# Patient Record
Sex: FEMALE | Race: WHITE | Employment: PART TIME | URBAN - NONMETROPOLITAN AREA
[De-identification: names, ages, dates, MRNs, and addresses within clinical notes are randomized per-mention and may not be internally consistent; named-entity substitution may affect disease eponyms.]

---

## 2020-06-18 ENCOUNTER — HOSPITAL ENCOUNTER (EMERGENCY)
Age: 20
Discharge: HOME | End: 2020-06-18
Payer: COMMERCIAL

## 2020-06-18 VITALS
SYSTOLIC BLOOD PRESSURE: 111 MMHG | BODY MASS INDEX: 27.74 KG/M2 | OXYGEN SATURATION: 99 % | HEIGHT: 66 IN | DIASTOLIC BLOOD PRESSURE: 64 MMHG | RESPIRATION RATE: 16 BRPM | WEIGHT: 172.6 LBS | HEART RATE: 75 BPM | TEMPERATURE: 97.2 F

## 2020-06-18 PROCEDURE — 99212 OFFICE O/P EST SF 10 MIN: CPT

## 2020-06-18 PROCEDURE — 99202 OFFICE O/P NEW SF 15 MIN: CPT | Performed by: NURSE PRACTITIONER

## 2020-06-18 RX ORDER — IBUPROFEN 800 MG/1
800 TABLET ORAL EVERY 6 HOURS PRN
COMMUNITY
End: 2020-07-31 | Stop reason: ALTCHOICE

## 2020-06-18 RX ORDER — AMOXICILLIN AND CLAVULANATE POTASSIUM 875; 125 MG/1; MG/1
1 TABLET, FILM COATED ORAL 2 TIMES DAILY WITH MEALS
Qty: 20 TABLET | Refills: 0 | Status: SHIPPED | OUTPATIENT
Start: 2020-06-18 | End: 2020-06-28

## 2020-06-18 ASSESSMENT — ENCOUNTER SYMPTOMS
CHEST TIGHTNESS: 0
DIARRHEA: 0
RHINORRHEA: 1
SINUS PRESSURE: 1
COUGH: 0
EYE REDNESS: 0
EYE ITCHING: 0
VOMITING: 0
ABDOMINAL PAIN: 0
SORE THROAT: 1
SHORTNESS OF BREATH: 0
NAUSEA: 0

## 2020-06-18 ASSESSMENT — PAIN DESCRIPTION - DESCRIPTORS: DESCRIPTORS: ACHING;SORE

## 2020-06-18 ASSESSMENT — PAIN DESCRIPTION - PAIN TYPE: TYPE: ACUTE PAIN

## 2020-06-18 ASSESSMENT — PAIN DESCRIPTION - FREQUENCY: FREQUENCY: CONTINUOUS

## 2020-06-18 ASSESSMENT — PAIN SCALES - GENERAL: PAINLEVEL_OUTOF10: 7

## 2020-06-18 ASSESSMENT — PAIN DESCRIPTION - LOCATION: LOCATION: EAR;THROAT

## 2020-06-18 NOTE — ED NOTES
Pt verbalized discharge instructions. Pt informed to go to ER if develop chest pain, shortness of breath or abdominal pain. Pt ambulatory out in stable condition. Assessment unchanged.        Raj Vivas RN  06/18/20 0214

## 2020-06-18 NOTE — ED TRIAGE NOTES
Pt ambulatory into esuc with c/o sinus drainage, sore throat with bilateral ear pain for the past four days. Pt states pain 7.

## 2020-07-31 ENCOUNTER — OFFICE VISIT (OUTPATIENT)
Dept: FAMILY MEDICINE CLINIC | Age: 20
End: 2020-07-31
Payer: COMMERCIAL

## 2020-07-31 VITALS
SYSTOLIC BLOOD PRESSURE: 110 MMHG | BODY MASS INDEX: 26.68 KG/M2 | OXYGEN SATURATION: 98 % | DIASTOLIC BLOOD PRESSURE: 80 MMHG | WEIGHT: 166 LBS | HEIGHT: 66 IN | HEART RATE: 78 BPM | TEMPERATURE: 97.1 F

## 2020-07-31 PROBLEM — J45.990 EXERCISE-INDUCED ASTHMA: Status: ACTIVE | Noted: 2020-07-31

## 2020-07-31 PROBLEM — R10.10 INTERMITTENT UPPER ABDOMINAL PAIN: Status: ACTIVE | Noted: 2020-07-31

## 2020-07-31 PROCEDURE — 99204 OFFICE O/P NEW MOD 45 MIN: CPT | Performed by: FAMILY MEDICINE

## 2020-07-31 ASSESSMENT — ENCOUNTER SYMPTOMS
SHORTNESS OF BREATH: 0
VOMITING: 0
COUGH: 0
CONSTIPATION: 1
DIARRHEA: 1
NAUSEA: 0

## 2020-07-31 ASSESSMENT — PATIENT HEALTH QUESTIONNAIRE - PHQ9
1. LITTLE INTEREST OR PLEASURE IN DOING THINGS: 0
SUM OF ALL RESPONSES TO PHQ9 QUESTIONS 1 & 2: 0
2. FEELING DOWN, DEPRESSED OR HOPELESS: 0
SUM OF ALL RESPONSES TO PHQ QUESTIONS 1-9: 0
SUM OF ALL RESPONSES TO PHQ QUESTIONS 1-9: 0

## 2020-07-31 NOTE — PROGRESS NOTES
pepper, zuchinni  Lunch -- left over chicken legs, rice and chicken broth and green beans  Breakfast -- bagel blueberry , pumpkin seed butter    Current Outpatient Medications:     Norgestimate-Eth Estradiol (SPRINTEC 28 PO), Take by mouth, Disp: , Rfl:     No Known Allergies    Subjective:      Review of Systems   Constitutional: Negative for fatigue and fever. Sometimes hot flashes   Respiratory: Negative for cough and shortness of breath. Cardiovascular: Negative for chest pain and leg swelling. Gastrointestinal: Positive for constipation (last episode from Friday night) and diarrhea (off and on 3 weeks ago, not sure of trigger). Negative for nausea and vomiting. No heartburn issues -- did have when had GB acting up   Skin: Negative for rash. Psychiatric/Behavioral: Negative for decreased concentration. Objective:     /80 (Site: Left Upper Arm, Position: Sitting, Cuff Size: Large Adult)   Pulse 78   Temp 97.1 °F (36.2 °C)   Ht 5' 6\" (1.676 m)   Wt 166 lb (75.3 kg)   SpO2 98%   BMI 26.79 kg/m²     Physical Exam  Constitutional:       General: She is not in acute distress. Appearance: Normal appearance. She is not ill-appearing. Cardiovascular:      Rate and Rhythm: Normal rate and regular rhythm. Heart sounds: No murmur. Pulmonary:      Effort: Pulmonary effort is normal. No respiratory distress. Breath sounds: Normal breath sounds. No wheezing. Abdominal:      General: Abdomen is flat. Bowel sounds are normal. There is no distension. Palpations: Abdomen is soft. There is no mass. Tenderness: There is no abdominal tenderness. There is no rebound. Musculoskeletal:         General: No swelling. Neurological:      Mental Status: She is alert. Psychiatric:         Mood and Affect: Mood normal.         Behavior: Behavior normal.         Thought Content:  Thought content normal.         Judgment: Judgment normal.         Assessment/Plan:

## 2020-07-31 NOTE — PATIENT INSTRUCTIONS
find links to an cari for your phone or other device. You'll find low-FODMAP cookbooks there too. After 6 to 8 weeks, you will start to try high-FODMAP foods again. You will add those foods back to your diet, one group at a time. Your doctor or dietitian will probably have you wait a few days before you add each new group of those foods. Keep a food diary. You can write down the foods you try and note how they make you feel. After a few weeks, you may have a better idea of what foods you should avoid and what foods make you feel your best.  What are the risks? There is some risk of not getting all of the vitamins and nutrients you need on the low-FODMAP diet. These include:  · Folate. · Thiamin. · Vitamin B6.  · Calcium. · Vitamin D. Your dietitian or doctor can help you find other sources of these if needed. This diet may limit your fiber intake. Try to plan your meals to include other sources of fiber. What foods are on the low-FODMAP diet? Here is a guide to foods that you can eat, plus the foods that you should avoid, when you are on the low-FODMAP diet. Grains  Okay to eat: Foods made from grains like arrowroot, buckwheat, corn, millet, and oats. You can also eat potato, quinoa, rice, sorghum, tapioca, and teff. Cereals, pasta, breads, corn tortillas and baked goods made from these grains are also okay. (These grains may be labeled \"gluten-free. \")  Avoid: Grains like wheat, barley, and rye. Avoid ingredients such as bulgur, couscous, durum, and semolina. And avoid cereals, breads, and pastas made from these grains. Avoid chickpea, lentil, and pea flour. Proteins  Okay to eat: Most meat, fish, and eggs without high-FODMAP sauces. You can have small amounts of almonds or hazelnuts (10 nuts). Macadamia nuts, peanuts, pecans, pine nuts, and walnuts are also okay. You can also eat lucas and pumpkin seeds, tofu, and tempeh. Avoid: Beans, chickpeas, lentils, and soybeans. Avoid pistachio and cashew nuts. And some sausages may have high-FODMAP ingredients. Dairy  Okay to eat: Lactose-free dairy milks. Rice milk and almond milk are okay. So are lactose-free yogurts, kefirs, ice creams, and sorbet from low-FODMAP fruits and sweeteners. (These are often labeled \"lactose-free. \") You can have small amounts (2 Tbsp) of cottage, cream, or ricotta cheese. Hard cheeses like cheddar, [place], ROSS, and Swiss are okay. So are small amounts (1 oz) of aged or ripened cheeses like Brie, blue, and feta. Avoid: Milk, including cow, goat, and sheep. Avoid condensed or evaporated milk, buttermilk, custard, cream, sour cream, yogurt, and ice cream. Avoid soy milk. (Check sauces for dairy ingredients.)  Vegetables  Okay to eat: Bamboo shoots, bell peppers, bok david, up to ½ cup of broccoli or cabbage (red or white), and cucumbers. Eggplant, green beans, lettuce, olives, parsnips, and potatoes are okay to eat. So are pumpkin, rutabaga, seaweed, sprouts, Swiss chard, and spinach. You can eat scallions (green part only) and squash (not butternut). You can eat tomatoes, turnips, watercress, yams, and zucchini. You can also have small amounts of artichoke hearts (from can, 1 oz), carrots, corn (½ cob), and sweet potato (½ cup). Avoid: Artichokes, asparagus, [place] sprouts, margarita cabbage, cauliflower, and celery. And avoid garlic, leeks, mushrooms, okra, onions, scallions (white part), shallots, and peas. Fruits  Okay to eat: Bananas, blueberries, cantaloupe, coconut, grapes, and honeydew. Kiwi, namrata, limes, oranges, passion fruit, papaya, and pineapple are also okay. You can eat plantain, raspberries, rhubarb, star fruit, strawberries, tangelo, and tangerine. You can also have small amounts of dried banana chips (up to 10 chips), dried cranberries (1 Tbsp), and shredded coconut (up to ¼ cup). Avoid: Apples, applesauce, apricots, avocados, blackberries, boysenberries, and cherries.  Also avoid dates, figs, grapefruit, guava, lychee, and mangoes. Don't eat nectarines, peaches, pears, persimmon, plums, prunes, tamarillo, or watermelon. And limit most canned and dried fruits. Oils, spices, condiments, and sweeteners  Okay to eat: Vegetable oils (including garlic infused), butter, ghee, lard, and margarine (no trans fat). You can have most fresh herbs like basil, chives, coriander, ana cristina, parsley, rosemary and thyme. You can have salt, jams made from low-FODMAP fruits, mayonnaise, and mustard. Soy sauce, hot sauce (no garlic), tamari, and vinegar are also okay. Sweeteners that are okay include sugar (sucrose), powdered (confectioner's) sugar, brown sugar, glucose, and maple syrup. You can also have some artificial sweeteners like aspartame, saccharine, and stevia. Avoid: Chutneys, hummus, jellies, garlic sauces, and gravies made with onion or garlic. Avoid pickles, relish, some salad dressings and soup stocks, salsa, and tomato paste. And avoid sauces and other foods with high fructose corn syrup, honey, molasses, and agave. Avoid artificial sweeteners (isomalt, mannitol, malitol, sorbitol, and xylitol). Avoid corn syrup solids, fructose, fruit juice concentrate, and polydextrose. Other foods and drinks  Okay to have: Water, soda water, tonic, soft drinks sweetened with sugar, ½ cup of low-FODMAP fruit juice, and most teas and alcohols. You can also eat foods made with baking powder and soda, cocoa, and gelatin. Avoid: Juices from high-FODMAP fruits and vegetables. And avoid fortified akash, chamomile and fennel teas, chicory-based drinks and coffee substitutes, and bouillon cubes. Follow-up care is a key part of your treatment and safety. Be sure to make and go to all appointments, and call your doctor if you are having problems. It's also a good idea to know your test results and keep a list of the medicines you take. Where can you learn more? Go to [email or web address]://[email or web address] org and sign in to your [email or web address] account.  Enter L235 in

## 2020-08-01 PROBLEM — E73.9 LACTOSE INTOLERANCE: Status: ACTIVE | Noted: 2020-08-01

## 2020-08-01 PROBLEM — R14.0 ABDOMINAL BLOATING: Status: ACTIVE | Noted: 2020-08-01

## 2020-08-04 ENCOUNTER — HOSPITAL ENCOUNTER (OUTPATIENT)
Age: 20
Discharge: HOME | End: 2020-08-04
Payer: COMMERCIAL

## 2020-08-04 DIAGNOSIS — R10.10 INTERMITTENT UPPER ABDOMINAL PAIN: ICD-10-CM

## 2020-08-04 DIAGNOSIS — R14.0 ABDOMINAL BLOATING: ICD-10-CM

## 2020-08-04 LAB
ALBUMIN SERPL-MCNC: 4.2 G/DL (ref 3.5–5.1)
ALP BLD-CCNC: 52 U/L (ref 38–126)
ALT SERPL-CCNC: 25 U/L (ref 11–66)
ANION GAP SERPL CALCULATED.3IONS-SCNC: 7 MEQ/L (ref 8–16)
AST SERPL-CCNC: 27 U/L (ref 5–40)
BASOPHILS # BLD: 0.3 %
BASOPHILS ABSOLUTE: 0 THOU/MM3 (ref 0–0.1)
BILIRUB SERPL-MCNC: 0.6 MG/DL (ref 0.3–1.2)
BUN BLDV-MCNC: 13 MG/DL (ref 7–22)
CALCIUM SERPL-MCNC: 8.9 MG/DL (ref 8.5–10.5)
CHLORIDE BLD-SCNC: 102 MEQ/L (ref 98–111)
CO2: 26 MEQ/L (ref 23–33)
CREAT SERPL-MCNC: 0.7 MG/DL (ref 0.4–1.2)
EOSINOPHIL # BLD: 1.9 %
EOSINOPHILS ABSOLUTE: 0.1 THOU/MM3 (ref 0–0.4)
ERYTHROCYTE [DISTWIDTH] IN BLOOD BY AUTOMATED COUNT: 11.7 % (ref 11.5–14.5)
ERYTHROCYTE [DISTWIDTH] IN BLOOD BY AUTOMATED COUNT: 40.9 FL (ref 35–45)
GFR SERPL CREATININE-BSD FRML MDRD: > 90 ML/MIN/1.73M2
GLUCOSE BLD-MCNC: 90 MG/DL (ref 70–108)
HCT VFR BLD CALC: 39.4 % (ref 37–47)
HEMOGLOBIN: 13.1 GM/DL (ref 12–16)
IMMATURE GRANS (ABS): 0.01 THOU/MM3 (ref 0–0.07)
IMMATURE GRANULOCYTES: 0.2 %
LYMPHOCYTES # BLD: 36.9 %
LYMPHOCYTES ABSOLUTE: 2.2 THOU/MM3 (ref 1–4.8)
MAGNESIUM: 2 MG/DL (ref 1.6–2.4)
MCH RBC QN AUTO: 32 PG (ref 26–33)
MCHC RBC AUTO-ENTMCNC: 33.2 GM/DL (ref 32.2–35.5)
MCV RBC AUTO: 96.3 FL (ref 81–99)
MONOCYTES # BLD: 6.6 %
MONOCYTES ABSOLUTE: 0.4 THOU/MM3 (ref 0.4–1.3)
NUCLEATED RED BLOOD CELLS: 0 /100 WBC
PLATELET # BLD: 195 THOU/MM3 (ref 130–400)
PMV BLD AUTO: 11.6 FL (ref 9.4–12.4)
POTASSIUM SERPL-SCNC: 4.1 MEQ/L (ref 3.5–5.2)
RBC # BLD: 4.09 MILL/MM3 (ref 4.2–5.4)
SEG NEUTROPHILS: 54.1 %
SEGMENTED NEUTROPHILS ABSOLUTE COUNT: 3.2 THOU/MM3 (ref 1.8–7.7)
SODIUM BLD-SCNC: 135 MEQ/L (ref 135–145)
TOTAL PROTEIN: 6.3 G/DL (ref 6.1–8)
TSH SERPL DL<=0.05 MIU/L-ACNC: 1.58 UIU/ML (ref 0.4–4.2)
WBC # BLD: 5.9 THOU/MM3 (ref 4.8–10.8)

## 2020-08-04 PROCEDURE — 85025 COMPLETE CBC W/AUTO DIFF WBC: CPT

## 2020-08-04 PROCEDURE — 36415 COLL VENOUS BLD VENIPUNCTURE: CPT

## 2020-08-04 PROCEDURE — 84443 ASSAY THYROID STIM HORMONE: CPT

## 2020-08-04 PROCEDURE — 83735 ASSAY OF MAGNESIUM: CPT

## 2020-08-04 PROCEDURE — 80053 COMPREHEN METABOLIC PANEL: CPT

## 2020-08-20 ENCOUNTER — PATIENT MESSAGE (OUTPATIENT)
Dept: FAMILY MEDICINE CLINIC | Age: 20
End: 2020-08-20
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
## 2020-08-24 ENCOUNTER — HOSPITAL ENCOUNTER (OUTPATIENT)
Dept: GENERAL RADIOLOGY | Age: 20
Discharge: HOME | End: 2020-08-24
Payer: COMMERCIAL

## 2020-08-24 ENCOUNTER — HOSPITAL ENCOUNTER (OUTPATIENT)
Age: 20
Discharge: HOME | End: 2020-08-24
Payer: COMMERCIAL

## 2020-08-24 PROCEDURE — 72040 X-RAY EXAM NECK SPINE 2-3 VW: CPT

## 2020-09-18 ENCOUNTER — HOSPITAL ENCOUNTER (OUTPATIENT)
Age: 20
Discharge: HOME | End: 2020-09-18
Payer: COMMERCIAL

## 2020-09-18 ENCOUNTER — OFFICE VISIT (OUTPATIENT)
Dept: FAMILY MEDICINE CLINIC | Age: 20
End: 2020-09-18
Payer: COMMERCIAL

## 2020-09-18 VITALS
BODY MASS INDEX: 26.9 KG/M2 | SYSTOLIC BLOOD PRESSURE: 120 MMHG | WEIGHT: 167.4 LBS | DIASTOLIC BLOOD PRESSURE: 58 MMHG | HEIGHT: 66 IN | TEMPERATURE: 97.2 F | HEART RATE: 60 BPM

## 2020-09-18 DIAGNOSIS — R14.0 ABDOMINAL BLOATING: ICD-10-CM

## 2020-09-18 DIAGNOSIS — R10.10 INTERMITTENT UPPER ABDOMINAL PAIN: ICD-10-CM

## 2020-09-18 DIAGNOSIS — E73.9 LACTOSE INTOLERANCE: ICD-10-CM

## 2020-09-18 PROCEDURE — 86255 FLUORESCENT ANTIBODY SCREEN: CPT

## 2020-09-18 PROCEDURE — 83516 IMMUNOASSAY NONANTIBODY: CPT

## 2020-09-18 PROCEDURE — 36415 COLL VENOUS BLD VENIPUNCTURE: CPT

## 2020-09-18 PROCEDURE — 99213 OFFICE O/P EST LOW 20 MIN: CPT | Performed by: FAMILY MEDICINE

## 2020-09-18 RX ORDER — OMEPRAZOLE 40 MG/1
40 CAPSULE, DELAYED RELEASE ORAL DAILY
Qty: 30 CAPSULE | Refills: 3 | Status: SHIPPED | OUTPATIENT
Start: 2020-09-18 | End: 2020-12-11

## 2020-09-18 ASSESSMENT — ENCOUNTER SYMPTOMS
SHORTNESS OF BREATH: 0
DIARRHEA: 0
NAUSEA: 0
TROUBLE SWALLOWING: 0
CONSTIPATION: 0
ABDOMINAL PAIN: 1
BLOOD IN STOOL: 0
VOMITING: 0
SORE THROAT: 0

## 2020-09-18 NOTE — PROGRESS NOTES
[address] [place] [address] [address], [address], [place]  Phone:  [phone]  [phone]       Name: Coty Tai  : 2000    Chief Complaint   Patient presents with   Kacey Carp     2 month check       HPI:     Coty Tai is a 23 y.o. female who presents today for     HPI    Continuing abdominal bloating, intermittent belly pain/  Probiotics -- stomach ache, 3 weeks then stopped   Having upper stomach pain --she describes a sensation of squeezing and tightness and heartburn. It is not with every food and sometimes she is apprised of is what triggers it. Eats fairly healthy and tries to keep acidic foods down. Coffee in am only. And does not do caffeine the rest the day  Citrus foods not much. Tomatoes here and there. She notes that when she has too much wheat products that she has worsening of her symptoms. Current Outpatient Medications:     omeprazole (PRILOSEC) 40 MG delayed release capsule, Take 1 capsule by mouth daily, Disp: 30 capsule, Rfl: 3    Norgestimate-Eth Estradiol (SPRINTEC 28 PO), Take by mouth, Disp: , Rfl:     No Known Allergies    Review of Systems   Constitutional: Negative for fatigue and fever. HENT: Negative for sore throat and trouble swallowing. Respiratory: Negative for shortness of breath. Cardiovascular: Negative for chest pain and leg swelling. Gastrointestinal: Positive for abdominal pain. Negative for blood in stool, constipation, diarrhea, nausea and vomiting. Objective:     BP (!) 120/58 (Site: Left Upper Arm, Position: Sitting, Cuff Size: Medium Adult)   Pulse 60   Temp 97.2 °F (36.2 °C) (Temporal)   Ht 5' 6\" (1.676 m)   Wt 167 lb 6.4 oz (75.9 kg)   LMP 2020   BMI 27.02 kg/m²     Physical Exam  Constitutional:       General: She is not in acute distress. Appearance: Normal appearance. She is not ill-appearing. HENT:      Head: Normocephalic.       Right Ear: External ear normal.      Left Ear: External ear normal.   Cardiovascular:      Rate and Rhythm: Normal rate and regular rhythm. Heart sounds: No murmur. Pulmonary:      Effort: Pulmonary effort is normal. No respiratory distress. Breath sounds: Normal breath sounds. No wheezing. Abdominal:      General: Abdomen is flat. Bowel sounds are normal. There is no distension. Tenderness: There is no abdominal tenderness. Musculoskeletal:         General: No swelling. Neurological:      Mental Status: She is alert. Psychiatric:         Mood and Affect: Mood normal.       No visits with results within 1 Month(s) from this visit.    Latest known visit with results is:   Hospital Outpatient Visit on 08/04/2020   Component Date Value Ref Range Status    Glucose 08/04/2020 90  70 - 108 mg/dL Final    CREATININE 08/04/2020 0.7  0.4 - 1.2 mg/dL Final    BUN 08/04/2020 13  7 - 22 mg/dL Final    Sodium 08/04/2020 135  135 - 145 meq/L Final    Potassium 08/04/2020 4.1  3.5 - 5.2 meq/L Final    Chloride 08/04/2020 102  98 - 111 meq/L Final    CO2 08/04/2020 26  23 - 33 meq/L Final    Calcium 08/04/2020 8.9  8.5 - 10.5 mg/dL Final    AST 08/04/2020 27  5 - 40 U/L Final    Alkaline Phosphatase 08/04/2020 52  38 - 126 U/L Final    Total Protein 08/04/2020 6.3  6.1 - 8.0 g/dL Final    Alb 08/04/2020 4.2  3.5 - 5.1 g/dL Final    Total Bilirubin 08/04/2020 0.6  0.3 - 1.2 mg/dL Final    ALT 08/04/2020 25  11 - 66 U/L Final    Performed at [address] [place] [id number]    WBC 08/04/2020 5.9  4.8 - 10.8 thou/mm3 Final    RBC 08/04/2020 4.09* 4.20 - 5.40 mill/mm3 Final    Hemoglobin 08/04/2020 13.1  12.0 - 16.0 gm/dl Final    Hematocrit 08/04/2020 39.4  37.0 - 47.0 % Final    MCV 08/04/2020 96.3  81.0 - 99.0 fL Final    MCH 08/04/2020 32.0  26.0 - 33.0 pg Final    MCHC 08/04/2020 33.2  32.2 - 35.5 gm/dl Final    RDW-CV 08/04/2020 11.7  11.5 - 14.5 % Final    RDW-SD 08/04/2020 40.9  35.0 - 45.0 fL Final    Platelets 01/65/1936 195  130 - 400 thou/mm3 Final    MPV 08/04/2020 11.6  9.4 - 12.4 fL Final    Seg Neutrophils 08/04/2020 54.1  % Final    Lymphocytes 08/04/2020 36.9  % Final    Monocytes 08/04/2020 6.6  % Final    Eosinophils 08/04/2020 1.9  % Final    Basophils 08/04/2020 0.3  % Final    Immature Granulocytes 08/04/2020 0.2  % Final    Segs Absolute 08/04/2020 3.2  1.8 - 7.7 thou/mm3 Final    Lymphocytes Absolute 08/04/2020 2.2  1.0 - 4.8 thou/mm3 Final    Monocytes Absolute 08/04/2020 0.4  0.4 - 1.3 thou/mm3 Final    Eosinophils Absolute 08/04/2020 0.1  0.0 - 0.4 thou/mm3 Final    Basophils Absolute 08/04/2020 0.0  0.0 - 0.1 thou/mm3 Final    Immature Grans (Abs) 08/04/2020 0.01  0.00 - 0.07 thou/mm3 Final    nRBC 08/04/2020 0  /100 wbc Final    Performed at [address] [place] [address], [address]    TSH 08/04/2020 1.580  0.400 - 4.20 uIU/mL Final    Performed at [address] [place] [address], [address]    Magnesium 08/04/2020 2.0  1.6 - 2.4 mg/dL Final    Performed at [address], [address]    Anion Gap 08/04/2020 7.0* 8.0 - 16.0 meq/L Final    Comment: ANION GAP = Sodium -(Chloride + CO2)  Performed at [address] [place] [address], [address]      [place] Filt Rate 08/04/2020 >90  ml/min/1.73m2 Final    Comment: Stage Description                    GFR, ml/min/1.73 m2   -   At increased risk               > or = 60 (with chronic                                       kidney disease risk factors)   1   Normal or increased GFR         > or = 90   2   Mildly or decreased GFR         60 - 89   3   Moderately decreased GFR        30 - 59   4   Severely decreased GFR          15 - 29   5   Kidney failure                  <15 (or dialysis)  Estimated GFR calculated using abbreviated MDRD formula as  recommended by [employer].   Calculation based  upon serum creatinine and adjusted for age, gender & rola.  Zuly Chawla. Internal Med., Vol. 139 (2) pg 137-147. Performed at [address] [place] [address], [address]          Assessment/Plan:     Harriett Dey was seen today for bloated. Diagnoses and all orders for this visit:    Gastroesophageal reflux disease without esophagitis  -     omeprazole (PRILOSEC) 40 MG delayed release capsule; Take 1 capsule by mouth daily    Abdominal bloating  -     Reticulin Antibodies; Future  -     Gliadin Antibody, IgA; Future  -     Gliadin Antibody, IgG; Future    Lactose intolerance  -     Reticulin Antibodies; Future  -     omeprazole (PRILOSEC) 40 MG delayed release capsule; Take 1 capsule by mouth daily  -     Gliadin Antibody, IgA; Future  -     Gliadin Antibody, IgG; Future    Intermittent upper abdominal pain  -     Reticulin Antibodies; Future  -     omeprazole (PRILOSEC) 40 MG delayed release capsule; Take 1 capsule by mouth daily  -     Gliadin Antibody, IgA; Future  -     Gliadin Antibody, IgG; Future    With upper GI symptoms, will try Prilosec 40 mg daily  Due to reported symptoms especially worsening with wheat consumption, we will check for celiac disease. The goal will be to use Prilosec for 2 to 3 months. Encourage continued adjustment of diet to help the esophagus and stomach lining heal.  If she is not able to get off Prilosec, then would recommend GI consultation. Return in about 3 months (around 12/18/2020). Future Appointments   Date Time Provider Avila Jernigan   9/21/2020  9:40 AM MD BEVERLEY Easley AM IIHERB   12/18/2020 11:20 AM MD BEVERLEY Easley AM OFFENEGG [email or web address]          This office note has been dictated. Effort was made to review for errors but some may have been missed. Please contact Sandy Reece of [facility] for clarification if needed.        Electronically signed by Karol Sánchez MD on 9/18/2020 at 5:22 PM

## 2020-09-21 ENCOUNTER — OFFICE VISIT (OUTPATIENT)
Dept: FAMILY MEDICINE CLINIC | Age: 20
End: 2020-09-21
Payer: COMMERCIAL

## 2020-09-21 VITALS
SYSTOLIC BLOOD PRESSURE: 110 MMHG | DIASTOLIC BLOOD PRESSURE: 72 MMHG | WEIGHT: 170.2 LBS | HEART RATE: 64 BPM | TEMPERATURE: 96.8 F | HEIGHT: 66 IN | BODY MASS INDEX: 27.35 KG/M2

## 2020-09-21 LAB
GLIADIN PEPTIDE IGA: 0.4 U/ML
GLIADIN PEPTIDE IGG: < 0.4 U/ML

## 2020-09-21 PROCEDURE — 99395 PREV VISIT EST AGE 18-39: CPT | Performed by: FAMILY MEDICINE

## 2020-09-21 NOTE — PROGRESS NOTES
Annual GYN Visit      Mikael Espinal  YOB: 2000    Date of Service:  9/21/2020    Chief Complaint:   Mikael Espinal is a 23 y.o. female who presents for routine annual gynecologic visit. HPI:  Patient is premenopausal- Patient's last menstrual period was 08/18/2020. [place] Good control of periods and dysmenorrhea with OCP not needing refill of such. Bleeding amount is 4-5 days and rarely a week -- even with OCPs. Much improved with OCP. Dental every 6 months. No eye exam.  Diet balanced  Good exercise   Sleep is good  Screen time is managed adequately. Seeing therapist once a week. Keeps a planner. Involved sexually with 1 partner. She denies irregular menses, dysmenorrhea and dyspareunia. Menopausal/perimenopausal symptoms: none. Hormone therapy side effects: none. No Known Allergies  No outpatient medications have been marked as taking for the 9/21/20 encounter (Office Visit) with Bayron Childs MD.       Preventive Care and Risk Factor Assessment  Health Maintenance   Topic Date Due    Pneumococcal 0-64 years Vaccine (1 of 1 - PPSV23) 04/12/2005    DTaP/Tdap/Td vaccine (6 - Tdap) 04/12/2010    HIV screen  04/12/2014    Chlamydia screen  04/12/2015    Cervical cancer screen  04/12/2020    Flu vaccine (1) 09/01/2020    Hepatitis A vaccine  Completed    Hepatitis B vaccine  Completed    Hib vaccine  Completed    HPV vaccine  Completed    Varicella vaccine  Completed    Meningococcal (ACWY) vaccine  Aged Out      Hx abnormal PAP: yes - at 25 yo HPV but resolved in follow up  Sexual activity: single partner, contraception - OCP (estrogen/progesterone). Hx of STD: no  Hx of abnormal mammogram: no  Self-breast exams: yes,   FH of breast cancer: no  FH of GYN cancer: no   Previous DEXA scan: no  Exercise: /, some ab work outs.     Social History     Tobacco Use   Smoking Status Never Smoker   Smokeless Tobacco Never Used      Social History Substance and Sexual Activity   Alcohol Use Yes    Comment: occ        There is no immunization history on file for this patient. Review of Systems:  A comprehensive review of systems was negative except for what was noted in the HPI. Wt Readings from Last 3 Encounters:   09/21/20 170 lb 3.2 oz (77.2 kg)   09/18/20 167 lb 6.4 oz (75.9 kg)   07/31/20 166 lb (75.3 kg)     BP Readings from Last 3 Encounters:   09/21/20 110/72   09/18/20 (!) 120/58   07/31/20 110/80       Physical Exam:  Vitals:    09/21/20 0945   BP: 110/72   Site: Left Upper Arm   Position: Sitting   Cuff Size: Medium Adult   Pulse: 64   Temp: 96.8 °F (36 °C)   TempSrc: Temporal   Weight: 170 lb 3.2 oz (77.2 kg)   Height: 5' 6\" (1.676 m)      Body mass index is 27.47 kg/m². Constitutional: She is oriented to person, place, and time. She appears well-developed and well-nourished. No distress. Neck: No mass and no thyromegaly present. Cardiovascular: Normal rate, regular rhythm and normal heart sounds. No murmur heard. Pulmonary/Chest: Effort and breath sounds normal.   Abdominal: Soft, non-tender. No distension or masses. Genitourinary: normal external genitalia, vulva, vagina, cervix, uterus and adnexa. Pap obtained. Breast exam:  breasts appear normal, no suspicious masses, no skin or nipple changes or axillary nodes. Neurological: She is alert and oriented to person, place, and time. Skin: Skin is warm and dry. No rash noted. No erythema. Psychiatric: She has a normal mood and affect.  Her behavior is normal.     Assessment/Plan:  [facility] Yony was seen today for gynecologic exam.    Diagnoses and all orders for this visit:    Encounter for annual routine gynecological examination  -     PAP SMEAR    Cervical cancer screening  -     PAP SMEAR      Future Appointments   Date Time Provider Avila Jernigan   12/18/2020 11:20 AM Kathy Figueredo MD [facility]

## 2020-09-28 LAB — Lab: NORMAL

## 2020-10-12 LAB — CYTOLOGY THIN PREP PAP: NORMAL

## 2020-10-14 ENCOUNTER — PATIENT MESSAGE (OUTPATIENT)
Dept: FAMILY MEDICINE CLINIC | Age: 20
End: 2020-10-14

## 2020-10-27 ENCOUNTER — PATIENT MESSAGE (OUTPATIENT)
Dept: FAMILY MEDICINE CLINIC | Age: 20
End: 2020-10-27

## 2020-10-27 NOTE — TELEPHONE ENCOUNTER
From: Jenny Anne  To: Mickey Tsang MD  Sent: 10/27/2020 2:46 PM EDT  Subject: Non-Urgent Medical Question    Good evening,  I am just wanting to update you on my stomach. The last month has been good, very little stomach issues/stool issues since the follow up. I have gotten a stomach supplement that helps my gut and does have a few billion culture organisms in it. I take two a day and eating has been better overall. However, I still experience constricting of the lower esophagus/upper abdomen. Also, within the last week and still to today I have experienced harder bowel movements and slight constipation. There is discomfort and a little pain in my upper abdomen when lightly pressed on. I haven't changed anything in my diet except eating healthier and I have had major bloating in the upper abdomen and discomfort when standing, although when laying or sitting down the pain subsides for the meantime. I'm unsure with what to do except for really watching what I eat.    Kind Regards,  Anna Grijalva

## 2020-11-13 RX ORDER — DICYCLOMINE HYDROCHLORIDE 10 MG/1
10 CAPSULE ORAL 3 TIMES DAILY PRN
Qty: 90 CAPSULE | Refills: 3 | Status: ON HOLD | OUTPATIENT
Start: 2020-11-13 | End: 2021-01-26 | Stop reason: HOSPADM

## 2020-11-13 RX ORDER — DICYCLOMINE HYDROCHLORIDE 10 MG/1
10 CAPSULE ORAL 3 TIMES DAILY PRN
Qty: 90 CAPSULE | Refills: 3 | Status: SHIPPED | OUTPATIENT
Start: 2020-11-13 | End: 2020-11-13 | Stop reason: SDUPTHER

## 2020-11-17 ENCOUNTER — PATIENT MESSAGE (OUTPATIENT)
Dept: FAMILY MEDICINE CLINIC | Age: 20
End: 2020-11-17

## 2020-11-17 RX ORDER — NORGESTIMATE AND ETHINYL ESTRADIOL 0.25-0.035
1 KIT ORAL DAILY
Qty: 3 PACKET | Refills: 3 | Status: ON HOLD | OUTPATIENT
Start: 2020-11-17 | End: 2021-01-26 | Stop reason: HOSPADM

## 2020-12-11 ENCOUNTER — OFFICE VISIT (OUTPATIENT)
Dept: FAMILY MEDICINE CLINIC | Age: 20
End: 2020-12-11
Payer: COMMERCIAL

## 2020-12-11 VITALS
HEIGHT: 66 IN | SYSTOLIC BLOOD PRESSURE: 120 MMHG | TEMPERATURE: 97.1 F | BODY MASS INDEX: 26.29 KG/M2 | WEIGHT: 163.6 LBS | DIASTOLIC BLOOD PRESSURE: 66 MMHG | HEART RATE: 68 BPM

## 2020-12-11 LAB
HCG, URINE, POC: NEGATIVE
Lab: NORMAL
NEGATIVE QC PASS/FAIL: NORMAL
POSITIVE QC PASS/FAIL: NORMAL

## 2020-12-11 PROCEDURE — 99213 OFFICE O/P EST LOW 20 MIN: CPT | Performed by: FAMILY MEDICINE

## 2020-12-11 PROCEDURE — 81025 URINE PREGNANCY TEST: CPT | Performed by: FAMILY MEDICINE

## 2020-12-11 NOTE — PROGRESS NOTES
[address] [place] [address] [address], [address], [place]  Phone:  [phone]  IHZ:[phone]       Name: Eduin Jean  : 2000    Chief Complaint   Patient presents with    Pelvic Pain     x 1 week       HPI:     Eduin Jean is a 23 y.o. female who presents today for     HPI    Patient is here to evaluate her pelvic pain has been going on for about a week. It started quite acutely and moderately to severely in pain. She was having intercourse and the left side of her pelvis began to hurt quite a bit. It calm down after she rested. She had no bleeding or significant vaginal discharge. She has been taking her birth control and does not believe she is pregnant. She has not had any nausea vomiting. No fevers or chills. No bowel or urination trouble. Since then the pain has calm down but it still little sore. Yesterday while she worked and had to walk at a fast pace, she noted that her left lower side seem to hurt a bit. Current Outpatient Medications:     norgestimate-ethinyl estradiol (SPRINTEC 28) 0.25-35 MG-MCG per tablet, Take 1 tablet by mouth daily, Disp: 3 packet, Rfl: 3    dicyclomine (BENTYL) 10 MG capsule, Take 1 capsule by mouth 3 times daily as needed (intestinal spasms), Disp: 90 capsule, Rfl: 3    omeprazole (PRILOSEC) 40 MG delayed release capsule, Take 1 capsule by mouth daily, Disp: 30 capsule, Rfl: 3    No Known Allergies    Review of Systems  As per HPI  Objective:     /66 (Site: Left Upper Arm, Position: Sitting, Cuff Size: Medium Adult)   Pulse 68   Temp 97.1 °F (36.2 °C) (Temporal)   Ht 5' 6\" (1.676 m)   Wt 163 lb 9.6 oz (74.2 kg)   LMP 2020   BMI 26.41 kg/m²     Physical Exam  Gen: NAD, AAO x 3, coherent, pleasant  Abdominal examination is soft, nontender except for a little soreness in the left lower quadrant. You examination shows normal anatomy. Vulva and vaginal tissues without any erythema or lesions.   Cervix is normal-appearing with a small amount of gas discharge that is sampled. Bimanual examination shows no pain in the right side but she did have some mild cervical motion tenderness and mild right adnexal pain. I am not able to feel any masses. Assessment/Plan:     Clementine Hollins was seen today for pelvic pain. Diagnoses and all orders for this visit:    Pelvic pain in female  -     POC Pregnancy Urine Qual  -     US TRANSVAGINAL NON OB; Future  -     C. Trachomatis / N. Gonorrhoeae, DNA Probe    Uterine adnexal pain  -     POC Pregnancy Urine Qual  -     US TRANSVAGINAL NON OB; Future  -     C. Trachomatis / N. Gonorrhoeae, DNA Probe    we discussed likelihood of ovarian cyst rupture  neg UPT  Will proceed with above work up  She is improving     Return for pending work up. Future Appointments   Date Time Provider Avila Hainesisti   12/18/2020 11:20 AM Tee Holcomb MD [facility] [place]          This office note has been dictated. Effort was made to review for errors but some may have been missed. Please contact Emelyn Booth of note for clarification if needed.        Electronically signed by Tee Holcomb MD on 12/11/2020 at 9:06 AM

## 2020-12-14 LAB
CHLAMYDIA TRACHOMATIS BY RT-PCR: NOT DETECTED
CT/NG SOURCE: NORMAL
NEISSERIA GONORRHOEAE BY RT-PCR: NOT DETECTED

## 2021-01-22 ENCOUNTER — HOSPITAL ENCOUNTER (INPATIENT)
Age: 21
LOS: 4 days | Discharge: HOME | DRG: 885 | End: 2021-01-26
Attending: PSYCHIATRY & NEUROLOGY | Admitting: PSYCHIATRY & NEUROLOGY
Payer: COMMERCIAL

## 2021-01-22 DIAGNOSIS — F32.9 MAJOR DEPRESSIVE DISORDER WITH CURRENT ACTIVE EPISODE, UNSPECIFIED DEPRESSION EPISODE SEVERITY, UNSPECIFIED WHETHER RECURRENT: Primary | ICD-10-CM

## 2021-01-22 PROBLEM — F33.9 MDD (MAJOR DEPRESSIVE DISORDER), RECURRENT EPISODE (HCC): Status: ACTIVE | Noted: 2021-01-22

## 2021-01-22 LAB
ACETAMINOPHEN LEVEL: < 5 UG/ML (ref 0–20)
ALBUMIN SERPL-MCNC: 4.4 G/DL (ref 3.5–5.1)
ALP BLD-CCNC: 49 U/L (ref 38–126)
ALT SERPL-CCNC: 19 U/L (ref 11–66)
AMPHETAMINE+METHAMPHETAMINE URINE SCREEN: NEGATIVE
ANION GAP SERPL CALCULATED.3IONS-SCNC: 11 MEQ/L (ref 8–16)
AST SERPL-CCNC: 24 U/L (ref 5–40)
BACTERIA: NORMAL
BARBITURATE QUANTITATIVE URINE: NEGATIVE
BASOPHILS # BLD: 0.3 %
BASOPHILS ABSOLUTE: 0 THOU/MM3 (ref 0–0.1)
BENZODIAZEPINE QUANTITATIVE URINE: NEGATIVE
BILIRUB SERPL-MCNC: 0.4 MG/DL (ref 0.3–1.2)
BILIRUBIN URINE: NEGATIVE
BLOOD, URINE: NEGATIVE
BUN BLDV-MCNC: 12 MG/DL (ref 7–22)
CALCIUM SERPL-MCNC: 9.2 MG/DL (ref 8.5–10.5)
CANNABINOID QUANTITATIVE URINE: POSITIVE
CASTS: NORMAL /LPF
CASTS: NORMAL /LPF
CHARACTER, URINE: CLEAR
CHLORIDE BLD-SCNC: 104 MEQ/L (ref 98–111)
CO2: 23 MEQ/L (ref 23–33)
COCAINE METABOLITE QUANTITATIVE URINE: NEGATIVE
COLOR: YELLOW
CREAT SERPL-MCNC: 0.5 MG/DL (ref 0.4–1.2)
CRYSTALS: NORMAL
EOSINOPHIL # BLD: 0.5 %
EOSINOPHILS ABSOLUTE: 0 THOU/MM3 (ref 0–0.4)
EPITHELIAL CELLS, UA: NORMAL /HPF
ERYTHROCYTE [DISTWIDTH] IN BLOOD BY AUTOMATED COUNT: 11.2 % (ref 11.5–14.5)
ERYTHROCYTE [DISTWIDTH] IN BLOOD BY AUTOMATED COUNT: 38.4 FL (ref 35–45)
ETHYL ALCOHOL, SERUM: < 0.01 %
GFR SERPL CREATININE-BSD FRML MDRD: > 90 ML/MIN/1.73M2
GLUCOSE BLD-MCNC: 95 MG/DL (ref 70–108)
GLUCOSE, URINE: NEGATIVE MG/DL
HCT VFR BLD CALC: 40.8 % (ref 37–47)
HEMOGLOBIN: 13.7 GM/DL (ref 12–16)
IMMATURE GRANS (ABS): 0.02 THOU/MM3 (ref 0–0.07)
IMMATURE GRANULOCYTES: 0.3 %
KETONES, URINE: NEGATIVE
LEUKOCYTE ESTERASE, URINE: NEGATIVE
LYMPHOCYTES # BLD: 21 %
LYMPHOCYTES ABSOLUTE: 1.2 THOU/MM3 (ref 1–4.8)
MCH RBC QN AUTO: 31.4 PG (ref 26–33)
MCHC RBC AUTO-ENTMCNC: 33.6 GM/DL (ref 32.2–35.5)
MCV RBC AUTO: 93.4 FL (ref 81–99)
MISCELLANEOUS LAB TEST RESULT: NORMAL
MONOCYTES # BLD: 5.3 %
MONOCYTES ABSOLUTE: 0.3 THOU/MM3 (ref 0.4–1.3)
NITRITE, URINE: NEGATIVE
NUCLEATED RED BLOOD CELLS: 0 /100 WBC
OPIATES, URINE: NEGATIVE
OSMOLALITY CALCULATION: 275.2 MOSMOL/KG (ref 275–300)
OXYCODONE: NEGATIVE
PH UA: 6 (ref 5–9)
PHENCYCLIDINE QUANTITATIVE URINE: NEGATIVE
PLATELET # BLD: 177 THOU/MM3 (ref 130–400)
PMV BLD AUTO: 11 FL (ref 9.4–12.4)
POTASSIUM SERPL-SCNC: 3.9 MEQ/L (ref 3.5–5.2)
PREGNANCY, SERUM: NEGATIVE
PROTEIN UA: NEGATIVE MG/DL
RBC # BLD: 4.37 MILL/MM3 (ref 4.2–5.4)
RBC URINE: NORMAL /HPF
RENAL EPITHELIAL, UA: NORMAL
SALICYLATE, SERUM: < 0.3 MG/DL (ref 2–10)
SEG NEUTROPHILS: 72.6 %
SEGMENTED NEUTROPHILS ABSOLUTE COUNT: 4.2 THOU/MM3 (ref 1.8–7.7)
SODIUM BLD-SCNC: 138 MEQ/L (ref 135–145)
SPECIFIC GRAVITY UA: 1.02 (ref 1–1.03)
TOTAL PROTEIN: 6.7 G/DL (ref 6.1–8)
TSH SERPL DL<=0.05 MIU/L-ACNC: 1.39 UIU/ML (ref 0.4–4.2)
UROBILINOGEN, URINE: 0.2 EU/DL (ref 0–1)
WBC # BLD: 5.8 THOU/MM3 (ref 4.8–10.8)
WBC UA: NORMAL /HPF
YEAST: NORMAL

## 2021-01-22 PROCEDURE — 82077 ASSAY SPEC XCP UR&BREATH IA: CPT

## 2021-01-22 PROCEDURE — 80143 DRUG ASSAY ACETAMINOPHEN: CPT

## 2021-01-22 PROCEDURE — 99285 EMERGENCY DEPT VISIT HI MDM: CPT

## 2021-01-22 PROCEDURE — 80179 DRUG ASSAY SALICYLATE: CPT

## 2021-01-22 PROCEDURE — 85025 COMPLETE CBC W/AUTO DIFF WBC: CPT

## 2021-01-22 PROCEDURE — 81001 URINALYSIS AUTO W/SCOPE: CPT

## 2021-01-22 PROCEDURE — 84443 ASSAY THYROID STIM HORMONE: CPT

## 2021-01-22 PROCEDURE — 6370000000 HC RX 637 (ALT 250 FOR IP): Performed by: PSYCHIATRY & NEUROLOGY

## 2021-01-22 PROCEDURE — 1240000000 HC EMOTIONAL WELLNESS R&B

## 2021-01-22 PROCEDURE — 84703 CHORIONIC GONADOTROPIN ASSAY: CPT

## 2021-01-22 PROCEDURE — 36415 COLL VENOUS BLD VENIPUNCTURE: CPT

## 2021-01-22 PROCEDURE — 80307 DRUG TEST PRSMV CHEM ANLYZR: CPT

## 2021-01-22 PROCEDURE — 80053 COMPREHEN METABOLIC PANEL: CPT

## 2021-01-22 RX ORDER — IBUPROFEN 200 MG
400 TABLET ORAL EVERY 6 HOURS PRN
Status: DISCONTINUED | OUTPATIENT
Start: 2021-01-22 | End: 2021-01-26 | Stop reason: HOSPADM

## 2021-01-22 RX ORDER — TRAZODONE HYDROCHLORIDE 50 MG/1
50 TABLET ORAL NIGHTLY PRN
Status: DISCONTINUED | OUTPATIENT
Start: 2021-01-22 | End: 2021-01-26 | Stop reason: HOSPADM

## 2021-01-22 RX ORDER — NICOTINE 21 MG/24HR
1 PATCH, TRANSDERMAL 24 HOURS TRANSDERMAL DAILY
Status: DISCONTINUED | OUTPATIENT
Start: 2021-01-22 | End: 2021-01-22

## 2021-01-22 RX ORDER — HYDROXYZINE HYDROCHLORIDE 25 MG/1
50 TABLET, FILM COATED ORAL 3 TIMES DAILY PRN
Status: DISCONTINUED | OUTPATIENT
Start: 2021-01-22 | End: 2021-01-26 | Stop reason: HOSPADM

## 2021-01-22 RX ORDER — MAGNESIUM HYDROXIDE/ALUMINUM HYDROXICE/SIMETHICONE 120; 1200; 1200 MG/30ML; MG/30ML; MG/30ML
30 SUSPENSION ORAL EVERY 6 HOURS PRN
Status: DISCONTINUED | OUTPATIENT
Start: 2021-01-22 | End: 2021-01-26 | Stop reason: HOSPADM

## 2021-01-22 RX ORDER — ACETAMINOPHEN 325 MG/1
650 TABLET ORAL EVERY 4 HOURS PRN
Status: DISCONTINUED | OUTPATIENT
Start: 2021-01-22 | End: 2021-01-26 | Stop reason: HOSPADM

## 2021-01-22 RX ADMIN — HYDROXYZINE HYDROCHLORIDE 50 MG: 25 TABLET ORAL at 19:31

## 2021-01-22 RX ADMIN — TRAZODONE HYDROCHLORIDE 50 MG: 50 TABLET ORAL at 21:42

## 2021-01-22 RX ADMIN — ACETAMINOPHEN 650 MG: 325 TABLET ORAL at 17:44

## 2021-01-22 ASSESSMENT — ENCOUNTER SYMPTOMS
BACK PAIN: 0
SINUS PRESSURE: 0
BLOOD IN STOOL: 0
DIARRHEA: 0
WHEEZING: 0
VOMITING: 0
COLOR CHANGE: 0
COUGH: 0
EYE REDNESS: 0
PHOTOPHOBIA: 0
ABDOMINAL DISTENTION: 0
SORE THROAT: 0
CHEST TIGHTNESS: 0
RHINORRHEA: 0
SHORTNESS OF BREATH: 0
CONSTIPATION: 0
NAUSEA: 0
VOICE CHANGE: 0
ABDOMINAL PAIN: 0

## 2021-01-22 ASSESSMENT — SLEEP AND FATIGUE QUESTIONNAIRES
SLEEP PATTERN: DIFFICULTY FALLING ASLEEP;DISTURBED/INTERRUPTED SLEEP
DIFFICULTY STAYING ASLEEP: YES
DO YOU USE A SLEEP AID: NO
DO YOU HAVE DIFFICULTY SLEEPING: YES
RESTFUL SLEEP: NO

## 2021-01-22 ASSESSMENT — PAIN SCALES - GENERAL: PAINLEVEL_OUTOF10: 5

## 2021-01-22 ASSESSMENT — PATIENT HEALTH QUESTIONNAIRE - PHQ9: SUM OF ALL RESPONSES TO PHQ QUESTIONS 1-9: 22

## 2021-01-22 NOTE — ED TRIAGE NOTES
Pt presents to ER with feeling suicidal and needing a psych evaluation. Pt states she has been feeling suicidal for a few months now. She does admit to having a plan and states she would shoot herself with a gun or crash her car. She denies having guns at home. She states she gets really angry and starts arguments with her boyfriend and doesn't know why. When she was 14 she cut herself but never received help for it. Patient placed in safe room that is ligature resistant with continuous monitoring in place. Provider notified, requested an assessment by behavioral health . Patient belongings secured in a locked lockers outside of the room. Explained suicide prevention precautions to the patient including constant observer.

## 2021-01-22 NOTE — ED NOTES
ED to inpatient nurses report    Chief Complaint   Patient presents with    Suicidal    Psychiatric Evaluation      Present to ED from home  LOC: alert and orientated to name, place, date  Vital signs   Vitals:    01/22/21 0954   BP: 137/79   Pulse: 83   Resp: 18   Temp: 98.1 °F (36.7 °C)   TempSrc: Oral   SpO2: 98%      Oxygen Baseline none    Current needs required none Bipap/Cpap No  LDAs:    Mobility: Independent  Pending ED orders: none  Present condition: stable  Person of contract in chart, phone number in chart  Our promise was given to patient    Electronically signed by Ferman Crigler, RN on 1/22/2021 at 11:57 AM       Barbara Ma RN  01/22/21 7512

## 2021-01-22 NOTE — PROGRESS NOTES
Provisional Diagnosis:   Unspecified Depressive Disorder     Risk, Psychosocial and Contextual Factors:  Discords with boyfriend    Current MH Treatment: Patient reports seeing Valentin duarte virtual counselor in [place]      Present Suicidal Behavior:      Verbal: Yes         Attempt: Denied    Access to Weapons:  Denied    Current Suicide Risk: Low, Moderate or High: High        Past Suicidal Behavior:       Verbal: Yes    Attempt: Denied    Self-Injurious/Self-Mutilation: Historical- cutting as a teenager    Traumatic Event Within Past 2 Weeks:  Denied     Current Abuse: Denied    Legal: Denied    Violence: Denied    Protective Factors: Boyfriend, parents and sister are supportive     Housing: Lives with boyfriend       CPAP/Oxygen/Ambulation Difficulties: Denied    Basic Vital Signs Normal?: Check with Patients Nurse prior to Calling Psychiatry    Critical Labs?: Check with Patients Nurse prior to Calling Psychiatry    Clinical Summary:      Patient is a 23year old female who presents to the ED voluntarily. Pt reports moving to this area from [place] in February of 2020. Reports suicidal thoughts \"whenever I feel really sad or down\". Reports experiencing them once or twice a week. Reports they have been more severe over the past few days. Reports a plan to get in her car and crash. Also reports thoughts to shoot herself but denies access to a gun. Reports intent. Denied previous attempts. Reports working part time at [employer] but can work up to 40 hours. Reports increased agitation and starting arguments with her boyfriend without cause. Has not been diagnosed with a mental health disorder or current medication. Reports depression, anhedonia, poor sleep, etc. Homicidal thoughts and/or plans denied. No delusions noted. Hallucinations denied. AOD denied. Level of Care Disposition:    5855  Consulted with medical provider. Patient is medically. Consulted with patients RN about abnormalities or medical concerns. No abnormalities or medical concerns noted. [facility] with Dr. Wallace Hays. Patient to be transferred for inpatient [facility] treatment. Call transferred to 4E. ED provider notified. Francisco Tomlin RN notified. 1148  Patient signed voluntary admit form on 4E.   1153  Pt provided with sandwich box per request.  1159  Report provided to [facility] on 4E. Tentative bed assignment is 70A. Will be notified when patient's bed becomes available due to discharges.

## 2021-01-22 NOTE — PROGRESS NOTES
Pt inquiring about outpatient treatment. Informed her about [facility] process and how that would not be appropriate due pt being high risk for suicide. Pt voiced understanding, still willing to stay.  Provided pt with phone to contact family per request

## 2021-01-22 NOTE — ED PROVIDER NOTES
[facility] Emergency Department    CHIEF COMPLAINT       Chief Complaint   Patient presents with    Suicidal    Psychiatric Evaluation       Nurses Notes reviewed and I agree except as noted in the HPI. HISTORY OF PRESENT ILLNESS    Arturo Thayer kelly 23 y.o. female who presents to the ED for evaluation of suicidal ideation. Patient states she recently has been having suicidal thoughts. She notes throughout this week she has been having episodes of agitation and anger, and she has been getting in arguments with her boyfriend. Then after the argument she feels very sad. She has the plan of driving her car into an accident. If she had a gun she notes she would shoot herself. She notes a history of anxiety, she talks with a psychologist out of [place]. She denies taking any antidepressants in the past.  She notes she is from [place] and she moved here to be with her boyfriend. She notes no medical history, she takes birth control orally. She has had her gallbladder removed in the past and she notes a history of possible IBS. She notes she is been taking probiotics which have improved this. She notes in the past she cut her right arm, not for suicidal thoughts but for release. HPI was provided by the patient. REVIEW OF SYSTEMS     Review of Systems   Constitutional: Negative for activity change, appetite change, chills, diaphoresis, fatigue, fever and unexpected weight change. HENT: Negative for congestion, hearing loss, postnasal drip, rhinorrhea, sinus pressure, sore throat and voice change. Eyes: Negative for photophobia, redness and visual disturbance. Respiratory: Negative for cough, chest tightness, shortness of breath and wheezing. Cardiovascular: Negative for chest pain and palpitations. Gastrointestinal: Negative for abdominal distention, abdominal pain, blood in stool, constipation, diarrhea, nausea and vomiting. Endocrine: Negative for cold intolerance, heat intolerance, polydipsia, polyphagia and polyuria. Genitourinary: Negative for difficulty urinating, dysuria, flank pain, frequency and vaginal pain. Musculoskeletal: Negative for arthralgias, back pain, gait problem, joint swelling, neck pain and neck stiffness. Skin: Negative for color change and rash. Allergic/Immunologic: Negative for immunocompromised state. Neurological: Negative for dizziness, tremors, weakness, light-headedness, numbness and headaches. Hematological: Does not bruise/bleed easily. Psychiatric/Behavioral: Positive for sleep disturbance and suicidal ideas. Negative for behavioral problems, confusion, decreased concentration, hallucinations and self-injury. The patient is nervous/anxious. PAST MEDICAL HISTORY   History reviewed. No pertinent past medical history. SURGICALHISTORY      has a past surgical history that includes Cholecystectomy; [place] tooth extraction; Tonsillectomy; and Adenoidectomy. CURRENT MEDICATIONS       Current Discharge Medication List      CONTINUE these medications which have NOT CHANGED    Details   norgestimate-ethinyl estradiol ([address] [address]) 0.25-35 MG-MCG per tablet Take 1 tablet by mouth daily  Qty: 3 packet, Refills: 3    Associated Diagnoses: Encounter for surveillance of contraceptive pills      dicyclomine (BENTYL) 10 MG capsule Take 1 capsule by mouth 3 times daily as needed (intestinal spasms)  Qty: 90 capsule, Refills: 3    Associated Diagnoses: Spasm of bowel             ALLERGIES     has No Known Allergies. FAMILY HISTORY     She indicated that her mother is alive. She indicated that her father is alive. She indicated that her sister is alive. family history includes Depression in her mother and sister; Diabetes in her father; High Blood Pressure in her father.     SOCIAL HISTORY       Social History     Socioeconomic History    Marital status: Single Spouse name: Not on file    Number of children: Not on file    Years of education: Not on file    Highest education level: Not on file   Occupational History    Not on file   Social Needs    Financial resource strain: Not on file    Food insecurity     Worry: Not on file     Inability: Not on file    Transportation needs     Medical: Not on file     Non-medical: Not on file   Tobacco Use    Smoking status: Never Smoker    Smokeless tobacco: Never Used   Substance and Sexual Activity    Alcohol use: Yes     Comment: occ    Drug use: Yes     Frequency: 7.0 times per week     Types: Marijuana    Sexual activity: Yes     Partners: Male   Lifestyle    Physical activity     Days per week: Not on file     Minutes per session: Not on file    Stress: Not on file   Relationships    Social connections     Talks on phone: Not on file     Gets together: Not on file     Attends Restoration service: Not on file     Active member of club or organization: Not on file     Attends meetings of clubs or organizations: Not on file     Relationship status: Not on file    Intimate partner violence     Fear of current or ex partner: Not on file     Emotionally abused: Not on file     Physically abused: Not on file     Forced sexual activity: Not on file   Other Topics Concern    Not on file   Social History Narrative    Not on file       PHYSICAL EXAM     INITIAL VITALS:  height is 5' 5.5\" (1.664 m) and weight is 160 lb (72.6 kg). Her oral temperature is 98.4 °F (36.9 °C). Her blood pressure is 124/78 and her pulse is 77. Her respiration is 16 and oxygen saturation is 98%. Physical Exam  Vitals signs and nursing note reviewed. Constitutional:       Appearance: Normal appearance. She is well-developed. HENT:      Head: Normocephalic. Mouth/Throat:      Pharynx: Uvula midline. Eyes:      Conjunctiva/sclera: Conjunctivae normal.   Neck:      Musculoskeletal: Normal range of motion and neck supple. Cardiovascular:      Rate and Rhythm: Normal rate and regular rhythm. Heart sounds: Normal heart sounds, S1 normal and S2 normal.   Pulmonary:      Effort: Pulmonary effort is normal. No respiratory distress. Breath sounds: Normal breath sounds. Chest:      Chest wall: No tenderness. Abdominal:      General: Bowel sounds are normal. There is no distension. Palpations: Abdomen is soft. Tenderness: There is no abdominal tenderness. Musculoskeletal: Normal range of motion. Lymphadenopathy:      Cervical: No cervical adenopathy. Skin:     General: Skin is warm and dry. Neurological:      Mental Status: She is alert and oriented to person, place, and time. Psychiatric:         Speech: Speech normal.         Behavior: Behavior normal.         Thought Content: Thought content normal.         DIFFERENTIAL DIAGNOSIS:   Depression, bipolar disorder, suicidal ideation,  DIAGNOSTIC RESULTS       RADIOLOGY: non-plainfilm images(s) such as CT, Ultrasound and MRI are read by the radiologist.  Plain radiographic images are visualized and preliminarily interpreted by the emergency physician unless otherwise stated below.   No orders to display         LABS:   Labs Reviewed   CBC WITH AUTO DIFFERENTIAL - Abnormal; Notable for the following components:       Result Value    RDW-CV 11.2 (*)     Monocytes Absolute 0.3 (*)     All other components within normal limits   SALICYLATE LEVEL - Abnormal; Notable for the following components:    Salicylate, Serum < 0.3 (*)     All other components within normal limits   COMPREHENSIVE METABOLIC PANEL   HCG, SERUM, QUALITATIVE   TSH WITHOUT REFLEX   ACETAMINOPHEN LEVEL   ETHANOL   URINE DRUG SCREEN   URINALYSIS WITH MICROSCOPIC   ANION GAP   GLOMERULAR FILTRATION RATE, ESTIMATED   OSMOLALITY       EMERGENCY DEPARTMENT COURSE:   Vitals:    Vitals:    01/22/21 0954 01/22/21 1447   BP: 137/79 124/78   Pulse: 83 77   Resp: 18 16 Temp: 98.1 °F (36.7 °C) 98.4 °F (36.9 °C)   TempSrc: Oral Oral   SpO2: 98% 98%   Weight:  160 lb (72.6 kg)   Height:  5' 5.5\" (1.664 m)       MDM    Patient was seen and evaluated in the emergency department, patient appeared to be in no acute distress, vital signs were reviewed, no significant findings were noted. Physical exam was completed, no significant normality noted. Labs were obtained to medically clear the patient. No significant findings were noted. Behavioral access team evaluated the patient, they feel the patient would benefit from admission, the patient was voluntary with this. While here in the emergency department patient maintained stable course and appropriate for admission. Medications   acetaminophen (TYLENOL) tablet 650 mg (has no administration in time range)   ibuprofen (ADVIL;MOTRIN) tablet 400 mg (has no administration in time range)   magnesium hydroxide (MILK OF MAGNESIA) 400 MG/5ML suspension 30 mL (has no administration in time range)   aluminum & magnesium hydroxide-simethicone (MAALOX) 200-200-20 MG/5ML suspension 30 mL (has no administration in time range)   hydrOXYzine (ATARAX) tablet 50 mg (has no administration in time range)   traZODone (DESYREL) tablet 50 mg (has no administration in time range)       Patient was seenindependently by myself. The patient's final impression and disposition and plan was determined by myself. CRITICAL CARE:   None    CONSULTS:  None    PROCEDURES:  None    FINAL IMPRESSION     1. Major depressive disorder with current active episode, unspecified depression episode severity, unspecified whether recurrent          DISPOSITION/PLAN   Patient admitted to psychiatric service    PATIENT REFERREDTO:  No follow-up provider specified.     DISCHARGE MEDICATIONS:  Current Discharge Medication List (Please note that portions of this note were completed with a voice recognition program.  Efforts were made to edit the dictations but occasionally words are mis-transcribed.)    Provider:  I personally performed the services described in the documentation,reviewed and edited the documentation which was dictated to the scribe in my presence, and it accurately records my words and actions.     Jamal Thomas CNP 01/22/21 3:34 PM    Christi Thomas, APRN - FAISAL        [employer] ANABELLE - CNP  01/22/21 1534

## 2021-01-23 PROCEDURE — 6370000000 HC RX 637 (ALT 250 FOR IP): Performed by: PSYCHIATRY & NEUROLOGY

## 2021-01-23 PROCEDURE — 6370000000 HC RX 637 (ALT 250 FOR IP): Performed by: NURSE PRACTITIONER

## 2021-01-23 PROCEDURE — 1240000000 HC EMOTIONAL WELLNESS R&B

## 2021-01-23 PROCEDURE — 99222 1ST HOSP IP/OBS MODERATE 55: CPT | Performed by: PSYCHIATRY & NEUROLOGY

## 2021-01-23 RX ORDER — LORAZEPAM 0.5 MG/1
0.5 TABLET ORAL EVERY 4 HOURS PRN
Status: DISCONTINUED | OUTPATIENT
Start: 2021-01-23 | End: 2021-01-26 | Stop reason: HOSPADM

## 2021-01-23 RX ORDER — BUSPIRONE HYDROCHLORIDE 5 MG/1
5 TABLET ORAL 2 TIMES DAILY
Status: DISCONTINUED | OUTPATIENT
Start: 2021-01-23 | End: 2021-01-26 | Stop reason: HOSPADM

## 2021-01-23 RX ORDER — NORGESTIMATE AND ETHINYL ESTRADIOL 0.25-0.035
1 KIT ORAL NIGHTLY
Status: DISCONTINUED | OUTPATIENT
Start: 2021-01-23 | End: 2021-01-26 | Stop reason: HOSPADM

## 2021-01-23 RX ADMIN — SERTRALINE 50 MG: 50 TABLET, FILM COATED ORAL at 10:58

## 2021-01-23 RX ADMIN — TRAZODONE HYDROCHLORIDE 50 MG: 50 TABLET ORAL at 22:09

## 2021-01-23 RX ADMIN — BUSPIRONE HYDROCHLORIDE 5 MG: 5 TABLET ORAL at 22:08

## 2021-01-23 RX ADMIN — NORGESTIMATE AND ETHINYL ESTRADIOL 1 TABLET: KIT ORAL at 22:09

## 2021-01-23 RX ADMIN — HYDROXYZINE HYDROCHLORIDE 50 MG: 25 TABLET ORAL at 17:37

## 2021-01-23 ASSESSMENT — PAIN SCALES - GENERAL: PAINLEVEL_OUTOF10: 0

## 2021-01-23 NOTE — PROGRESS NOTES
4828 Clinician made rounds. Clinician introduced self and encouraged patient to check in with needs, questions, or concerns as they arise with . 1400 Patient may be a canidate for IOP. Note left for weekday .

## 2021-01-23 NOTE — GROUP NOTE
Group Therapy Note    Date: 1/23/2021    Group Start Time: 1430  Group End Time: 1245  Group Topic: Psychoeducation    [facility] Adult [facility] 4E    PRINCESS Montenegro, SUDHEER        Group Therapy Note    Attendees: 12         Patient's Goal:  Patient were challenged to identify triggers and stressors. Patient identified coping skills. Notes:  Patient actively participated. Status After Intervention:  Improved    Participation Level:  Active Listener    Participation Quality: Appropriate, Attentive and Supportive      Speech:  normal      Thought Process/Content: Logical      Affective Functioning: Congruent      Mood: euthymic      Level of consciousness:  Alert, Oriented x4 and Attentive      Response to Learning: Able to verbalize current knowledge/experience, Able to verbalize/acknowledge new learning, Able to retain information and Capable of insight      Endings: None Reported    Modes of Intervention: Education, Support, Socialization, Exploration, Clarifying, Problem-solving and Activity      Discipline Responsible: /Counselor      Signature:  PRINCESS Montenegro LSW

## 2021-01-23 NOTE — PROGRESS NOTES
[facility]  Initial Interdisciplinary Treatment Plan NOTE    Review Date & Time: 1/22/2021 1015    Patient was in treatment team.  See Multidisciplinary Treatment Team sheet for participants. Admission Type:   Admission Type: Voluntary    Reason for admission:  Reason for Admission: MDD      Estimated Length of Stay Update:  3-5 days   Estimated Discharge Date Update: 3-5 days     PATIENT STRENGTHS:  Patient Strengths Strengths: Social Skills, Positive Support, Communication, Connection to output provider, No significant Physical Illness  Patient Strengths and Limitations:Limitations: Inappropriate/potentially harmful leisure interests, External locus of control  Addictive Behavior:Addictive Behavior  In the past 3 months, have you felt or has someone told you that you have a problem with:  : None  Do you have a history of Chemical Use?: No  Do you have a history of Alcohol Use?: No  Do you have a history of Street Drug Abuse?: Yes  Histroy of Prescripton Drug Abuse?: No  Medical Problems:History reviewed. No pertinent past medical history. EDUCATION:   Learner Progress Toward Treatment Goals: Reviewed results and recommendations of this team, Reviewed group plan and strategies, Reviewed signs, symptoms and risk of self harm and violent behavior and Reviewed goals and plan of care    Method: Individual    Outcome: Verbalized understanding and Demonstrated Understanding    PATIENT GOALS: Patient identified the goals to improve coping skills and identify triggers. PLAN/TREATMENT RECOMMENDATIONS UPDATE:   1. What is the most important thing we can help you with while you are here? Patient would like to learn triggers and identify coping skills. 2. Who is your support system? Patient identifies support in her parents and boyfriend. 3. Do you have follow-up providers? Patient is connected to a therapist in [place]. Patient would like to be connected to providers locally. 4. Do you have the ability to pay for your medications? BCBS  5. Where will you be residing when you leave the hospital?  Patient reports that she will return to her duplex in [facility] where she lives with her boyfriend. 6. Will need a return to work slip or FMLA paper completion? Yes, Patient is currently a  at [employer].        GOALS UPDATE:   Time frame for Short-Term Goals: Daily    PRINCESS Tobin, LSW

## 2021-01-23 NOTE — H&P
Department of Psychiatry  Psychiatric Assessment      CHIEF COMPLAINT:  Suicidal ideations      HISTORY OF PRESENT ILLNESS:    Jaya  is a 23year old female who presents to the ED voluntarily. Pt reports moving to this area from [place] in February of 2020. Reports suicidal thoughts \"whenever I feel really sad or down\". Reports experiencing them once or twice a week. Reports they have been more severe over the past few days. Reports a plan to get in her car and crash. Also reports thoughts to shoot herself but denies access to a gun. Reports intent. Denied previous attempts. Reports working part time at [email or web address] but can work up to 40 hours. Reports increased agitation and starting arguments with her boyfriend without cause. Has not been diagnosed with a mental health disorder or current medication. Reports depression, anhedonia, poor sleep, etc. Homicidal thoughts and/or plans denied. No delusions noted. Hallucinations denied. AOD denied. Upon admission to E4 psychiatric unit:  Rachele denies feelings of harm towards self or others. Reports having depression and has struggled with this off and on for years. Denies ever being on a mental health med. Reports occasional mood swing. Repots past daily cannabis use and stopped 2 weeks ago. Reports came to hospital after argument with boyfriend she reports started by her. Reports had not been sleeping well. But slept well last night. Verified slept 7.5 hours continuous. Reports appetite is good. Reports having mother sister and boyfriend as support systems. Labs reviewed drawn 1-22-21 reflect no critical abnormals. Pregnancy negative. UDS positive for cannabis. Receptive for trial of Zoloft for c/o depression      PSYCHIATRIC HISTORY:      · Outpatient psychiatric provider:  Reports being distance counseling with therapist in [place].  Denies ever being under care of psychiatrist  · Suicide attempts: Denies  · Inpatient psychiatric admissions: Denies Past psychiatric medications includes:      Denies  Adverse reactions from psychotropic medications:     Denies        Past Medical History:    History reviewed. No pertinent past medical history. Past Surgical History:    Past Surgical History:   Procedure Laterality Date    ADENOIDECTOMY      CHOLECYSTECTOMY      TONSILLECTOMY      WISDOM TOOTH EXTRACTION          Medications Prior to Admission:   Current Outpatient Medications   Medication Instructions    dicyclomine (BENTYL) 10 mg, Oral, 3 TIMES DAILY PRN    norgestimate-ethinyl estradiol (SPRINTEC 28) 0.25-35 MG-MCG per tablet 1 tablet, Oral, DAILY       Allergies:     No Known Allergies     Social History:      · RESIDENCE:  Lives with boyfriend in [place], [place]  · LEVEL OF EDUCATION:  [facility]  · MARITAL STATUS: Never . Reports being 2.5 year relationship and reports boyfriend is supportive. · CHILDREN: Denies having any children   · OCCUPATION: Reports works [employer] as a . · SUBSTANCE ABUSE: Denies any issues with alcohol. Reports cannabis use daily but stopped 2 weeks ago. · PATIENT ASSETS: Desire to get better. Family Psychiatric and Medical History:      Family History   Reports mother and sister  has depression and anxiety  Maternal Uncle is alcoholic           Psychiatric Review of Systems       ·    Obsessions and Compulsions: denies  ·    Melba or Hypomania: denies  ·    Hallucinations: denies  ·    Panic Attacks:  denies   ·    Delusions:  denies  ·    Phobias: denies        Medical Review of Systems:      Constitutional: Negative for appetite change, diaphoresis, fatigue and fever. HENT: Negative for congestion, sore throat and tinnitus. Eyes: Negative for visual disturbance. Respiratory: Negative for cough, shortness of breath and wheezing. Cardiovascular: Negative for chest pain and leg swelling. Gastrointestinal: Negative for nausea, vomiting, diarrhea. Negative for abdominal pain. Genitourinary: Negative for frequency. Musculoskeletal: Negative for arthralgias, myalgias and neck stiffness. Skin: Negative for puritis. Neurological: Negative for dizziness, weakness and headaches. All other systems reviewed and are negative. PHYSICAL EXAM:  Vitals:   /78   Pulse 83   Temp 98.3 °F (36.8 °C) (Oral)   Resp 14   Ht 5' 5.5\" (1.664 m)   Wt 160 lb (72.6 kg)   LMP 01/15/2021   SpO2 99%   Breastfeeding No   BMI 26.22 kg/m²      Physical Exam:     Constitutional: Well developed, well nourished, no acute distress  Eyes: Pupils round and reactive to light bilaterally  Neck:  Supple, no thyromegaly. Cardiovascular:  Normal rate and rhythm, normal S1 and S2. No murmur or gallop on auscultation. Radial pulses 2+ and brisk bilaterally  Lungs: Clear to auscultation bilaterally without wheezing or rales. Musculoskeletal:  Full range of motion in all four extremities. Neurologic:  Cranial nerves II through XII are grossly intact. Normal gait and station.          Mental Status Examination:    Level of consciousness:  within normal limits  Appearance:  well-appearing, hospital attire, in chair, good grooming and good hygiene  Behavior/Motor:  no abnormalities noted  Attitude toward examiner:  cooperative, attentive and good eye contact  Speech:  spontaneous, normal rate and normal volume  Mood: Dysthymic  Affect:  Reactive  Thought processes:  linear, goal directed and coherent  Thought content:  Denies homicidal ideation  Suicidal Ideation:  denies suicidal ideation  Delusions:  no evidence of delusions  Perceptual Disturbance:  denies any perceptual disturbance  Cognition: Patient is oriented to person, place, time and situation  Concentration: clinically adequate  Memory: intact  Insight & Judgement: fair            DSM-5 DIAGNOSIS:            Patient Active Problem List   Diagnosis  Major Depressive D/O recurrent severe without psychosis  Possible Bipolar II D/O   Cannabis Use D/O severe in early remission            Psychosocial and Contextual Factors:      Relational         TREATMENT PLAN  Risk Management:  close watch per standard protocol  Psychotherapy:  participation in milieu and group and individual sessions with Attending Physician,  and Physician Assistant/CNP  Reason for Admission to Psychiatric Unit:  Threat to self  Estimated length of stay:  Greater than two midnights will be required to reach therapeutic levels of medications and to stabilize mood to where step down and management in a less restrictive environment is appropriate        GENERAL PATIENT/FAMILY EDUCATION  Patient will understand basic signs and symptoms, Patient will understand benefits/risks and potential side effects from proposed meds and Patient will understand their role in recovery. Family is  active in patient's care. Patient assets that may be helpful during treatment include: Intent to participate and engage in treatment, sufficient fund of knowledge and intellect to understand and utilize treatments. Goals:    Encouraged patient to engage in groups, milieu, and individual therapies offered as part of programing. Start Zoloft 50mg po q am for C/O depression      Behavioral Services  Medicare Certification Upon Admission     I certify that this patient's inpatient psychiatric hospital admission is medically necessary for:   X (1) Treatment which could reasonably be expected to improve this patient's condition,       X (2) Or for diagnostic study;      AND     X (2) The inpatient psychiatric services are provided while the individual is under the care of a physician and are included in the individualized plan of care.      Estimated length of stay/service: Greater than two midnights will be required to reach therapeutic levels of medications and to stabilize mood Plan for post-hospital care:  Follow up with outpatient psychiatric services     Electronically signed by  Wei Barrow, FAISAL 9-

## 2021-01-23 NOTE — PLAN OF CARE
Problem: Suicide risk  Goal: Provide patient with safe environment  Description: Provide patient with safe environment  Outcome: Ongoing  Note: The patient is checked every 15 minutes during day time hours and every 30 minutes during night time hours to ensure safety. Problem: Altered Mood, Depressive Behavior:  Goal: Able to verbalize acceptance of life and situations over which he or she has no control  Description: Able to verbalize acceptance of life and situations over which he or she has no control  Outcome: Ongoing  Note: The patient continues to work toward accepting life situations. Goal: Able to verbalize and/or display a decrease in depressive symptoms  Description: Able to verbalize and/or display a decrease in depressive symptoms  Outcome: Ongoing  Note: the patient states she does not feel depressed. Goal: Ability to disclose and discuss suicidal ideas will improve  Description: Ability to disclose and discuss suicidal ideas will improve  Outcome: Ongoing  Note: The patient denies suicidal ideation. Goal: Able to verbalize support systems  Description: Able to verbalize support systems  Outcome: Ongoing  Note: the patient states her support system is her boyfriend and sister. Goal: Absence of self-harm  Description: Absence of self-harm  Outcome: Ongoing  Note: The patient remains absent of self-harm. Goal: Patient specific goal  Description: Patient specific goal  Outcome: Ongoing  Note: the patient did not make a goal today. Goal: Participates in care planning  Description: Participates in care planning  Outcome: Ongoing  Note: The patient participates in interdisciplinary care planning. Care plan reviewed with patient. Patient verbalized understanding of the plan of care and contribute to goal setting.

## 2021-01-23 NOTE — PROGRESS NOTES
[facility] Biopsychosocial Assessment    Current Level of Psychosocial Functioning     Independent     XXX  Dependent    Minimal Assist     Psychosocial High Risk Factors (check all that apply)    Unable to obtain meds   Chronic illness/pain    Substance abuse    XXX  Lack of Family Support   Financial stress   Isolation       Inadequate Community Resources  Suicide attempt(s)  Not taking medications    XXX  Victim of crime   Developmental Delay  Unable to manage personal needs    Age 72 or older   Homeless  No transportation   Readmission within 30 days  Unemployment     Traumatic Event    Sexual Orientation: Heterosexual      Patient Strengths:Positive Support     Patient Barriers: Difficulty problem solving/relies on others to help solve problems; Tendency to isolate self; Difficult relationships / poor social skills    Plan of Care     medication management, group/individual therapies, family meetings, psycho -education, treatment team meetings to assist with stabilization    Initial Discharge Plan:  Patient will return to her own home in [address] [place] [address]  [facility]. Patient would like to be connected to local providers. She is currentl connected with a provider in [place].        Clinical Summary: Ronny Andrade is a 23year old female who presents to the ED voluntarily. Pt reports moving to this area from [place] in February of 2020. Reports suicidal thoughts \"whenever I feel really sad or down\". Reports experiencing them once or twice a week. Reports they have been more severe over the past few days. Reports a plan to get in her car and crash. Also reports thoughts to shoot herself but denies access to a gun. Reports intent. Denied previous attempts. Reports working part time at [email or web address] but can work up to 40 hours. Reports increased agitation and starting arguments with her boyfriend without cause. Has not been diagnosed with a mental health disorder or current medication. Reports depression, anhedonia, poor sleep, etc. Homicidal thoughts and/or plans denied. No delusions noted. Hallucinations denied. AOD denied. Rachele denies feelings of harm towards self or others. Reports having depression and has struggled with this off and on for years. Denies ever being on a mental health med. Reports occasional mood swing. Repots past daily cannabis use and stopped 2 weeks ago. Reports came to hospital after argument with boyfriend she reports started by her. Reports had not been sleeping well. But slept well last night. Verified slept 7.5 hours continuous. Reports appetite is good. Reports having mother sister and boyfriend as support systems. 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 Patient identifies school stressors as well. Patient states that her future is the biggest deterrent.

## 2021-01-24 PROCEDURE — 1240000000 HC EMOTIONAL WELLNESS R&B

## 2021-01-24 PROCEDURE — 6370000000 HC RX 637 (ALT 250 FOR IP): Performed by: PSYCHIATRY & NEUROLOGY

## 2021-01-24 PROCEDURE — 99231 SBSQ HOSP IP/OBS SF/LOW 25: CPT | Performed by: NURSE PRACTITIONER

## 2021-01-24 PROCEDURE — 6370000000 HC RX 637 (ALT 250 FOR IP): Performed by: NURSE PRACTITIONER

## 2021-01-24 RX ADMIN — BUSPIRONE HYDROCHLORIDE 5 MG: 5 TABLET ORAL at 21:49

## 2021-01-24 RX ADMIN — MAGNESIUM HYDROXIDE 30 ML: 2400 SUSPENSION ORAL at 11:58

## 2021-01-24 RX ADMIN — SERTRALINE 50 MG: 50 TABLET, FILM COATED ORAL at 21:49

## 2021-01-24 RX ADMIN — NORGESTIMATE AND ETHINYL ESTRADIOL 1 TABLET: KIT ORAL at 21:50

## 2021-01-24 RX ADMIN — BUSPIRONE HYDROCHLORIDE 5 MG: 5 TABLET ORAL at 08:36

## 2021-01-24 RX ADMIN — TRAZODONE HYDROCHLORIDE 50 MG: 50 TABLET ORAL at 21:49

## 2021-01-24 ASSESSMENT — PAIN SCALES - GENERAL: PAINLEVEL_OUTOF10: 0

## 2021-01-24 NOTE — PLAN OF CARE
Problem: Suicide risk  Goal: Provide patient with safe environment  Description: Provide patient with safe environment  Outcome: Met This Shift  Note: Patient denies suicidal ideation this shift. Problem: Altered Mood, Depressive Behavior:  Goal: Able to verbalize acceptance of life and situations over which he or she has no control  Description: Able to verbalize acceptance of life and situations over which he or she has no control  Outcome: Met This Shift  Note: Patient reports that she is feeling \"Much Much better\" this shift. Patient states \"I finally feel like my brain is getting right. \"   Goal: Able to verbalize and/or display a decrease in depressive symptoms  Description: Able to verbalize and/or display a decrease in depressive symptoms  Outcome: Met This Shift  Note: Patient denies depression this shift. Goal: Ability to disclose and discuss suicidal ideas will improve  Description: Ability to disclose and discuss suicidal ideas will improve  Outcome: Met This Shift  Note: Patient denies suicidal ideation this shift. Goal: Able to verbalize support systems  Description: Able to verbalize support systems  Outcome: Met This Shift  Note: Patient verbailzes that her boyfriend, sister, and co-workers are her support system. Goal: Absence of self-harm  Description: Absence of self-harm  Outcome: Met This Shift  Note: Patient makes no attempt to harm self this shift. Goal: Patient specific goal  Description: Patient specific goal  Outcome: Met This Shift  Note: Patient sets goal of \"learning coping skills\" this shift.    Goal: Participates in care planning  Description: Participates in care planning  Outcome: Met This Shift  Note: Patient continues to work on care planning with care team.

## 2021-01-24 NOTE — PROGRESS NOTES
1046 Discharge planning: Patient wishes to follow up with [facility] at the [place] office. Called [phone] and left message requesting a follow up appointment. Patient will return to her home in [place] upon discharge.

## 2021-01-24 NOTE — PLAN OF CARE
Problem: Suicide risk  Goal: Provide patient with safe environment  Description: Provide patient with safe environment  1/23/2021 2348 by Leighton Martinez RN  Outcome: Ongoing  Note: The patient is checked every 15 minutes during day time hours and every 30 minutes during night time hours to ensure safety. 1/23/2021 1505 by Paula Mendez RN  Outcome: Met This Shift  Note: Patient denies suicidal ideation this shift. Problem: Altered Mood, Depressive Behavior:  Goal: Able to verbalize acceptance of life and situations over which he or she has no control  Description: Able to verbalize acceptance of life and situations over which he or she has no control  1/23/2021 2348 by Leighton Martinez RN  Outcome: Ongoing  Note: The patient continues to work toward accepting life situations. 1/23/2021 1505 by Paula Mendez RN  Outcome: Met This Shift  Note: Patient denies depression this shift. Goal: Able to verbalize and/or display a decrease in depressive symptoms  Description: Able to verbalize and/or display a decrease in depressive symptoms  1/23/2021 2348 by Leighton Martinez RN  Outcome: Ongoing  Note: the patient voiced that she did not feel depressed this evening and that she thinks her medications are really helping her.  1/23/2021 1505 by Paula Mendez RN  Outcome: Met This Shift  Note: Patient denies depression this shift. Goal: Ability to disclose and discuss suicidal ideas will improve  Description: Ability to disclose and discuss suicidal ideas will improve  1/23/2021 2348 by Leighton Martinez RN  Outcome: Ongoing  Note: The patient denies suicidal ideation. 1/23/2021 1505 by Paula Mendez RN  Outcome: Met This Shift  Note: Patient denies suicidal ideation this shift.    Goal: Able to verbalize support systems  Description: Able to verbalize support systems  1/23/2021 2348 by Leighton Martinez RN  Outcome: Ongoing Note: the patient states her support system is her boyfriend and sister. 1/23/2021 1505 by Radha Lawson RN  Outcome: Met This Shift  Goal: Absence of self-harm  Description: Absence of self-harm  1/23/2021 2348 by Dean Alejandre RN  Outcome: Ongoing  Note: The patient remains absent of self-harm. 1/23/2021 1505 by Radha Lawson RN  Outcome: Met This Shift  Note: Patient makes no attempt to harm self this shift. Goal: Patient specific goal  Description: Patient specific goal  1/23/2021 2348 by Dean Alejandre RN  Outcome: Ongoing  1/23/2021 1505 by Radha Lawson RN  Outcome: Ongoing  Note: Patient makes no goal this shift. Said \" not really\" when asked if she had a goal.   Goal: Participates in care planning  Description: Participates in care planning  1/23/2021 2348 by Dean Alejandre RN  Outcome: Ongoing  Note: The patient participates in interdisciplinary care planning. 1/23/2021 1505 by Radha Lawson RN  Outcome: Ongoing  Note: Patient participates in care planning with social work this shift. Care plan reviewed with patient. Patient verbalized understanding of the plan of care and contribute to goal setting.

## 2021-01-24 NOTE — PROGRESS NOTES
Group Therapy Note    Date: 1/23/2021  Start Time: 2000  End Time:  2020    Type of Group: Relaxation    Notes:  cooperative    Status After Intervention:  Unchanged    Participation Level: Interactive    Participation Quality: Appropriate      Speech:  normal      Thought Process/Content: Logical      Affective Functioning: Congruent      Mood: normal      Level of consciousness:  Alert      Response to Learning: Able to verbalize current knowledge/experience      Endings: None Reported    Modes of Intervention: Education and Support      Discipline Responsible: Registered Nurse      Signature:  Radha Cm RN

## 2021-01-24 NOTE — PROGRESS NOTES
Psychiatry Progress Note      CC: Major Depressive D/O recurrent severe without psychosis  Possible Bipolar II D/O   Cannabis Use D/O severe in early remission                   Subjective    Progress:  Rachele reports feeling better today. Reports depression is less. Denies feelings of harm towards self or others. Reports Zoloft seem to be working. Denies having side effects from meds. Good med compliance is verified. Reports appetite is good and slept well last night. Verified slept 7 hours. States she attended groups yesterday and found them helpful Reports boyfriend visited yesterday and states shti really helped her mood. Reports also talked with friend on phone. Objective  /84   Pulse 89   Temp 98 °F (36.7 °C) (Oral)   Resp 16   Ht 5' 5.5\" (1.664 m)   Wt 160 lb (72.6 kg)   LMP 01/15/2021   SpO2 99%   Breastfeeding No   BMI 26.22 kg/m²   MSE:    Level of consciousness: Alert  Appearance: hospital attire, in chair and fair grooming   Behavior/Motor: no abnormalities noted   Attitude toward examiner: cooperative   Speech: Normal volume, goal directed, NRR  Mood: Euthymic  Affect: Reactive  Thought processes: Linear and goal directed   Suicidal Ideation: Denies suicidal ideations  Homicidal ideation: Denies homicidal ideations  Delusions: No evidence of delusions is observed  Perceptual Disturbance: Denies AH/VH;  No evidence of psychosis is observed. Cognition: Oriented to person, place, time and situation   Concentration fair   Memory intact   Insight: Fair  Judgment: Fair    Assessment:  Major Depressive D/O recurrent severe without psychosis  Possible Bipolar II D/O   Cannabis Use D/O severe in early remission    Plan:  Continue current meds as ordered  Continue to encourage group attendance.       Wayne Moscoso, FAISAL  8-         Major Depression, severe recurrent    I concur with above clinical findings and plan of care

## 2021-01-24 NOTE — GROUP NOTE
Group Therapy Note    Date: 1/24/2021    Group Start Time: 0200  Group End Time: 7096  Group Topic: Psychotherapy    [facility] Adult Psych 4E    SUDHEER Joseph        Group Therapy Note    Attendees:          Patient's Goal:  Identify triggers and reactions to triggers and how to rethink those reactions. Notes:  Patient was able to identify triggers and reactions to triggers. Patient shared personal experiences and related those experiences to other patients. Status After Intervention:  Improved    Participation Level:  Active Listener and Interactive    Participation Quality: Appropriate, Attentive, Sharing and Supportive      Speech:  normal      Thought Process/Content: Logical      Affective Functioning: Congruent      Mood: euthymic      Level of consciousness:  Alert and Oriented x4      Response to Learning: Able to verbalize current knowledge/experience, Able to verbalize/acknowledge new learning, Able to retain information, Capable of insight and Progressing to goal      Endings: None Reported    Modes of Intervention: Education, Support, Problem-solving and Activity      Discipline Responsible: /Counselor      Signature:  SUDHEER Joseph

## 2021-01-24 NOTE — PATIENT CARE CONFERENCE
5 [facility]  Day 3 Interdisciplinary Treatment Plan NOTE    Review Date & Time: 1/24/21 1002    Patient was in treatment team    Admission Type:   Admission Type: Voluntary    Reason for admission:  Reason for Admission: MDD  Estimated Length of Stay Update:  3-5 days  Estimated Discharge Date Update: 3-5 days    PATIENT STRENGTHS:  Patient Strengths Strengths: Social Skills, Positive Support, Communication, Connection to output provider, No significant Physical Illness  Patient Strengths and Limitations:Limitations: Inappropriate/potentially harmful leisure interests, External locus of control  Addictive Behavior:Addictive Behavior  In the past 3 months, have you felt or has someone told you that you have a problem with:  : None  Do you have a history of Chemical Use?: No  Do you have a history of Alcohol Use?: No  Do you have a history of Street Drug Abuse?: Yes  Histroy of Prescripton Drug Abuse?: No  Medical Problems:History reviewed. No pertinent past medical history. Risk:  Fall RiskTotal: 55  Vick Scale Vick Scale Score: 22  BVC Total: 0  Change in scores no.  Changes to plan of Care no    Status EXAM:   Status and Exam  Normal: Yes  Facial Expression: Brightened  Affect: Appropriate  Level of Consciousness: Alert  Mood:Normal: Yes  Mood: Anxious  Motor Activity:Normal: Yes  Interview Behavior: Cooperative  Preception: Ledger to Person, Kadi Evaristo to Time, Ledger to Place, Ledger to Situation  Attention:Normal: Yes  Thought Processes: Circumstantial  Thought Content:Normal: Yes  Hallucinations: None  Delusions: No  Memory:Normal: Yes  Insight and Judgment: No  Insight and Judgment: Poor Judgment, Poor Insight  Present Suicidal Ideation: No  Present Homicidal Ideation: No    Daily Assessment Last Entry:   Daily Sleep (WDL): Within Defined Limits         Patient Currently in Pain: Denies  Daily Nutrition (WDL): Within Defined Limits    Patient Monitoring: Frequency of Checks: 4 times per hour, close    Psychiatric Symptoms:   Depression Symptoms  Depression Symptoms: No problems reported or observed. Anxiety Symptoms  Anxiety Symptoms: No problems reported or observed. Melba Symptoms  Melba Symptoms: No problems reported or observed. Psychosis Symptoms  Delusion Type: No problems reported or observed. Suicide Risk CSSR-S:  1) Within the past month, have you wished you were dead or wished you could go to sleep and not wake up? : Yes  2) Have you actually had any thoughts of killing yourself? : Yes  3) Have you been thinking about how you might kill yourself? : Yes  5) Have you started to work out or worked out the details of how to kill yourself? Do you intend to carry out this plan? : No  6) Have you ever done anything, started to do anything, or prepared to do anything to end your life?: No  Change in Result no Change in Plan of care no      EDUCATION:   Learner Progress Toward Treatment Goals: Reviewed results and recommendations of this team, Reviewed group plan and strategies and Reviewed goals and plan of care    Method: Individual    Outcome: Verbalized understanding and Demonstrated Understanding    PATIENT GOALS: Improve coping skills, identify triggers    PLAN/TREATMENT RECOMMENDATIONS UPDATE:  1. How are you progressing toward meeting your main treatment goal?   \"I was able to use what we talked about in group yesterday to identify some of my triggers and how I react to them\"  2. Are there discharge barriers/lingering problems that need to be addressed? None reported      3. Do you have the ability to pay for your medications? BCBS      4. How is your group participation? Good, attending groups and completing worksheets given.     GOALS UPDATE:   Time frame for Short-Term Goals: Daily      SUDHEER Jorge

## 2021-01-25 PROBLEM — F33.2 SEVERE EPISODE OF RECURRENT MAJOR DEPRESSIVE DISORDER, WITHOUT PSYCHOTIC FEATURES (HCC): Status: ACTIVE | Noted: 2021-01-22

## 2021-01-25 PROCEDURE — 6370000000 HC RX 637 (ALT 250 FOR IP): Performed by: PSYCHIATRY & NEUROLOGY

## 2021-01-25 PROCEDURE — APPSS30 APP SPLIT SHARED TIME 16-30 MINUTES: Performed by: PHYSICIAN ASSISTANT

## 2021-01-25 PROCEDURE — 6370000000 HC RX 637 (ALT 250 FOR IP): Performed by: PHYSICIAN ASSISTANT

## 2021-01-25 PROCEDURE — 99232 SBSQ HOSP IP/OBS MODERATE 35: CPT | Performed by: PSYCHIATRY & NEUROLOGY

## 2021-01-25 PROCEDURE — 1240000000 HC EMOTIONAL WELLNESS R&B

## 2021-01-25 RX ADMIN — TRAZODONE HYDROCHLORIDE 50 MG: 50 TABLET ORAL at 22:48

## 2021-01-25 RX ADMIN — SERTRALINE 50 MG: 50 TABLET, FILM COATED ORAL at 21:05

## 2021-01-25 RX ADMIN — BUSPIRONE HYDROCHLORIDE 5 MG: 5 TABLET ORAL at 08:06

## 2021-01-25 RX ADMIN — NORGESTIMATE AND ETHINYL ESTRADIOL 1 TABLET: KIT ORAL at 21:05

## 2021-01-25 RX ADMIN — BUSPIRONE HYDROCHLORIDE 5 MG: 5 TABLET ORAL at 21:05

## 2021-01-25 NOTE — PROGRESS NOTES
Topic Purpose & benefits of working a 12 step program EA/AA/NA    Start 1100    End 1150    Participation active, involved      Response verbalized understanding of importance of having support in recovery with 12 step fellowship in [employer]      Behavior appropriate, engaged with active listening

## 2021-01-25 NOTE — SUICIDE SAFETY PLAN
Making the environment safe: How can I make my environment (house/apartment/living space) safer? For example, can I remove guns, medications, and other items? 1. Have a safe word and safe plan with boyfriend for heighted anxiety/depression  2.  Ensure that I have outlets to release my anxiety

## 2021-01-25 NOTE — GROUP NOTE
Group Therapy Note    Date: 1/25/2021    Group Start Time: 1430  Group End Time: 1500  Group Topic: Healthy Living/Wellness    [facility]    Charisse Mckenna LPN        Group Therapy Note    Attendees: 8             Notes:  attended    Status After Intervention:  Improved    Participation Level:  Active Listener and Interactive    Participation Quality: Appropriate, Attentive and Sharing      Speech:  normal      Thought Process/Content: Logical      Affective Functioning: Flat      Level of consciousness:  Alert, Oriented x4 and Attentive      Response to Learning: Able to verbalize/acknowledge new learning, Able to retain information and Capable of insight      Endings: None Reported    Modes of Intervention: Education, Support and Socialization      Discipline Responsible: Licensed Practical Nurse      Signature:  Charisse Mckenna LPN

## 2021-01-25 NOTE — PROGRESS NOTES
Group Therapy Note    Date: 1/24/2021  Start Time: 2000  End Time:  2020    Type of Group: Relaxation      Status After Intervention:  Improved    Participation Level:  Active Listener and Interactive    Participation Quality: Appropriate, Attentive, Sharing and Supportive      Speech:  normal      Thought Process/Content: Logical      Affective Functioning: Congruent      Mood: calm      Level of consciousness:  Alert, Oriented x4 and Attentive      Response to Learning: Able to verbalize current knowledge/experience      Endings: None Reported    Modes of Intervention: Support and Socialization      Discipline Responsible: Registered Nurse      Signature:  Sylvia Cruz RN

## 2021-01-25 NOTE — PROGRESS NOTES
ibuprofen (ADVIL;MOTRIN) tablet 400 mg, 400 mg, Oral, Q6H PRN  magnesium hydroxide (MILK OF MAGNESIA) 400 MG/5ML suspension 30 mL, 30 mL, Oral, Daily PRN  aluminum & magnesium hydroxide-simethicone (MAALOX) 200-200-20 MG/5ML suspension 30 mL, 30 mL, Oral, Q6H PRN  hydrOXYzine (ATARAX) tablet 50 mg, 50 mg, Oral, TID PRN  traZODone (DESYREL) tablet 50 mg, 50 mg, Oral, Nightly PRN     Physical     height is 5' 5.5\" (1.664 m) and weight is 160 lb (72.6 kg). Her oral temperature is 97.9 °F (36.6 °C). Her blood pressure is 101/65 and her pulse is 84. Her respiration is 16 and oxygen saturation is 98%. Lab Results   Component Value Date    WBC 5.8 01/22/2021    HGB 13.7 01/22/2021    HCT 40.8 01/22/2021     01/22/2021    ALT 19 01/22/2021    AST 24 01/22/2021     01/22/2021    K 3.9 01/22/2021     01/22/2021    CREATININE 0.5 01/22/2021    BUN 12 01/22/2021    CO2 23 01/22/2021    TSH 1.390 01/22/2021          Mental Status Exam:   Level of consciousness: Alert  Appearance: hospital attire, in chair and fair grooming   Behavior/Motor: Pleasant, brightens with interaction  Attitude toward examiner: cooperative, attentive, good eye contact  Speech: Normal rate and volume  Mood: \"Good\" per patient  Affect: Reactive  Thought processes: Linear and goal directed   Suicidal Ideation: Denies suicidal ideations  Homicidal ideation: Denies homicidal ideations  Delusions: No evidence of delusions is observed  Perceptual Disturbance: Denies AH/VH;  No evidence of psychosis is observed.   Cognition: Oriented to person, place, time and situation   Concentration adequate  Memory intact   Insight: Good  Judgment: Good      ASSESSMENT    Major Depressive D/O recurrent severe without psychosis  Rule out bipolar II D/O   Cannabis Use D/O severe in early remission    PLAN    Patient's symptoms show improvement today  We will change Zoloft to 50 mg nightly Attempt to develop insight, psycho-education and supportive therapy conducted. Probable discharge: Tomorrow  Follow-up: TBD    Electronically signed by Steven Monroy PA-C on 1/25/2021 at 5:09 PM Reviewed patient's current plan of care and vital signs with nursing staff. Psychiatry Attending Attestation     I assessed this patient and reviewed the case and plan of care with Steven Monroy PA-C. I have reviewed the above documentation and I agree with the findings and treatment plan with the following updates. Patient feels better than before. Mood and affect are better. Patient reports fleeting suicidal thoughts with no intent or plan. Patient notes that these thoughts are occurring less frequently. Denies any homicidal thoughts, that was explored with the patient. Oriented to time place and person. Recent and remote memory is intact. Patient feels hopeful. Sleep and appetite is good. No side effect from medication reported. Side-effect of medication were discussed with the patient . Patient is responding to current treatment. Discharge soon, if patient continues to show improvement. Case discussed with the staff. Electronically signed by Stacey Dos Santos MD on 1/25/21 at 8:17 PM EST    **This report has been created using voice recognition software. It may contain minor errors which are inherent in voice recognition technology. **

## 2021-01-25 NOTE — BH NOTE
PLAN OF CARE:     Start Time: 0900  End Time:  0925    Group Topic:  Daily Goals    Group Type:   Goal Group    Intervention/Goal:  To increase support and identify daily goals    Attendance:  attended    Affect:  bright    Behavior:  Pleasant and cooperative    Response:  Identified a daily goal    Daily Goal:    Learn to cope with things I can't control.      Progress:  Progressing to goal

## 2021-01-25 NOTE — PLAN OF CARE
Patient has attended all of the groups today and has been out of her room to socialize with others so she has met her socialization goal for this shift.

## 2021-01-25 NOTE — GROUP NOTE
Group Therapy Note    Date: 1/25/2021    Group Start Time: 6514  Group End Time: 1430  Group Topic: Psychotherapy    [facility]    Carles Schirmer, LSW        Group Therapy Note    Attendees:        Patient's Goal:  Identify forms of communication and how they impact feelings. Notes:  Patient contributed to conversation and encouraged other patients. Patient was able to identify different forms of communication. Status After Intervention:  Improved    Participation Level:  Active Listener and Interactive    Participation Quality: Appropriate, Attentive, Sharing and Supportive      Speech:  normal      Thought Process/Content: Logical      Affective Functioning: Congruent      Mood: euthymic      Level of consciousness:  Alert and Oriented x4      Response to Learning: Able to verbalize current knowledge/experience, Able to verbalize/acknowledge new learning, Able to retain information, Capable of insight and Progressing to goal      Endings: None Reported    Modes of Intervention: Education, Support, Socialization and Problem-solving      Discipline Responsible: /Counselor      Signature:  Carles Schirmer, LSW

## 2021-01-25 NOTE — BH NOTE
Group Therapy Note    Date: 1/25/2021  Start Time:  1000  End Time:   1030    Number of Participants: 11    Type of Group: Recreational/Coping      Patient's Goal:  Increase knowledge of anxiety and depression triggers. Increase knowledge of positive coping skills. Notes:  Patient was social with others. Patient was able to identify anxiety and depression triggers. Patient was able to identify coping skills.      Status After Intervention:  Improved    Participation Level: Interactive    Participation Quality: Appropriate, Attentive and Sharing      Speech:  normal      Thought Process/Content: Logical      Affective Functioning: Congruent      Mood: euthymic      Level of consciousness:  Oriented x4      Response to Learning: Progressing to goal      Endings: None Reported    Modes of Intervention: Education, Support, Socialization, Exploration and Activity      Discipline Responsible: Psychoeducational Specialist      Signature:  Mesfin Alatorre

## 2021-01-25 NOTE — BH NOTE
INPATIENT RECREATIONAL THERAPY  ADULT BEHAVIORAL SERVICES  EVALUATION    REFERRING PHYSICIAN:   Dr. Letty Julio  DIAGNOSIS:    Major Depressive Disorder, Recurrent Episode  PRECAUTIONS:    Standard precautions    HISTORY OF PRESENT ILLNESS/INJURY:  Patient was admitted to the unit due to suicidal ideation and depression. Patient stated that she had thoughts of wrecking her car prior to admission. Patient also stated that she was having thoughts of shooting herself. Patient reported relationship stress with her boyfriend. Patient also reported school stressors as she is working toward her Master's Degree for Clinical Counseling. Patient was cooperative and pleasant at time of evaluation. PMH:  Please see medical chart for prior medical history, allergies, and medication    HISTORY OF PSYCHIATRIC TREATMENT:  Patient has a therapist in [place]. DATE OF BIRTH:  [birth year]  GENDER:   female  MARITAL STATUS:  Patient has a boyfriend. EMPLOYMENT STATUS:  employed at [employer]. LIVING SITUATION/SUPPORT:  Lives with her boyfriend. Patient's boyfriend, her mother and her sister are all supportive. EDUCATIONAL LEVEL:  Currently working on her Master's Degree for clinical couseling. MEDICATION/DRUG USE:  Marijuana use. Medication noncompliance. LEISURE INTERESTS:  listening to music, spiritual activities, reading, watching TV and movies, family activities, activities with friends, working out/exercising, meditation, walking, out to eat, nature activities  ACTIVITY PREFERENCE:   No preference  ACTIVITY TYPES:  Passive. Indoor. Outdoor. Active. COGNITION:   A&Ox4    COPING:    poor  ATTENTION:   fair  RELAXATION:   Patient reported anxiety and panic attacks.    SELF-ESTEEM:   poor  MOTIVATION:   good    SOCIAL SKILLS:  good  FRUSTRATION TOLERANCE:   good  ATTENTION SEEKING:   None noted  COOPERATION:  Pleasant and cooperative  AFFECT:  bright  APPEARANCE:  appropriate    HEARING:   No problems VISION:  No problems  VERBAL COMMUNICATION:  No problems  WRITTEN COMMUNICATION:   No problems    COORDINATION:  No problems  MOBILITY:  Ambulates independently  GOALS:   Identify 2 new positive coping skills by time of discharge.

## 2021-01-25 NOTE — PLAN OF CARE
Problem: Altered Mood, Depressive Behavior:  Goal: Able to verbalize acceptance of life and situations over which he or she has no control  Description: Able to verbalize acceptance of life and situations over which he or she has no control  1/24/2021 2357 by Len King RN  Outcome: Ongoing  Note: Patient starting to verbalize acceptance of life and situations which she has no control. 1/24/2021 1507 by Migel Sanchez RN  Outcome: Met This Shift  Note: Patient reports that she is feeling \"Much Much better\" this shift. Patient states \"I finally feel like my brain is getting right. \"   Goal: Able to verbalize and/or display a decrease in depressive symptoms  Description: Able to verbalize and/or display a decrease in depressive symptoms  1/24/2021 2357 by Len King RN  Outcome: Ongoing  Note: Patient denies feelings of depression this evening. 1/24/2021 1507 by Migel Sanchez RN  Outcome: Met This Shift  Note: Patient denies depression this shift. Goal: Ability to disclose and discuss suicidal ideas will improve  Description: Ability to disclose and discuss suicidal ideas will improve  1/24/2021 2357 by Len King RN  Outcome: Met This Shift  Note: Patient denies suicidal thoughts. 1/24/2021 1507 by Migel Sanchez RN  Outcome: Met This Shift  Note: Patient denies suicidal ideation this shift. Goal: Able to verbalize support systems  Description: Able to verbalize support systems  1/24/2021 2357 by Len King RN  Outcome: Met This Shift  Note: Patient verbalizes support. 1/24/2021 1507 by Migel Sanchez RN  Outcome: Met This Shift  Note: Patient verbailzes that her boyfriend, sister, and co-workers are her support system. Goal: Absence of self-harm  Description: Absence of self-harm  1/24/2021 2357 by Len King RN  Outcome: Met This Shift  Note: Absence of self harm this evening.   1/24/2021 1507 by Migel Sanchez RN Outcome: Met This Shift  Note: Patient makes no attempt to harm self this shift. Goal: Patient specific goal  Description: Patient specific goal  1/24/2021 2357 by Trevor Meyer RN  Outcome: Met This Shift  Note: Patient able to make goals. 1/24/2021 1507 by Yareli Cote RN  Outcome: Met This Shift  Note: Patient sets goal of \"learning coping skills\" this shift. Goal: Participates in care planning  Description: Participates in care planning  1/24/2021 2357 by Trevor Meyer RN  Outcome: Met This Shift  Note: Care plan reviewed with patient. Patient does verbalize understanding of the plan of care and does contribute to goal setting.  1/24/2021 1507 by Yareli Cote RN  Outcome: Met This Shift  Note: Patient continues to work on care planning with care team.      Problem: Discharge Planning:  Goal: Discharged to appropriate level of care  Description: Discharged to appropriate level of care  Outcome: Ongoing  Note: Discharge planning in progress, patient not discharged this evening. Problem: Activity:  Goal: Sleeping patterns will improve  Description: Sleeping patterns will improve  Outcome: Ongoing  Note: Within normal limits.

## 2021-01-26 VITALS
BODY MASS INDEX: 25.71 KG/M2 | HEIGHT: 66 IN | SYSTOLIC BLOOD PRESSURE: 93 MMHG | OXYGEN SATURATION: 98 % | HEART RATE: 65 BPM | TEMPERATURE: 97.8 F | DIASTOLIC BLOOD PRESSURE: 58 MMHG | WEIGHT: 160 LBS | RESPIRATION RATE: 18 BRPM

## 2021-01-26 PROCEDURE — 6370000000 HC RX 637 (ALT 250 FOR IP): Performed by: PSYCHIATRY & NEUROLOGY

## 2021-01-26 PROCEDURE — 99239 HOSP IP/OBS DSCHRG MGMT >30: CPT | Performed by: PSYCHIATRY & NEUROLOGY

## 2021-01-26 PROCEDURE — 5130000000 HC BRIDGE APPOINTMENT

## 2021-01-26 RX ORDER — BUSPIRONE HYDROCHLORIDE 5 MG/1
5 TABLET ORAL 2 TIMES DAILY
Qty: 60 TABLET | Refills: 0 | Status: SHIPPED | OUTPATIENT
Start: 2021-01-26 | End: 2021-02-25

## 2021-01-26 RX ORDER — ONDANSETRON 4 MG/1
8 TABLET, ORALLY DISINTEGRATING ORAL ONCE
Status: COMPLETED | OUTPATIENT
Start: 2021-01-26 | End: 2021-01-26

## 2021-01-26 RX ADMIN — BUSPIRONE HYDROCHLORIDE 5 MG: 5 TABLET ORAL at 08:03

## 2021-01-26 RX ADMIN — ONDANSETRON 8 MG: 4 TABLET, ORALLY DISINTEGRATING ORAL at 05:46

## 2021-01-26 RX ADMIN — HYDROXYZINE HYDROCHLORIDE 50 MG: 25 TABLET ORAL at 03:55

## 2021-01-26 ASSESSMENT — PAIN SCALES - GENERAL
PAINLEVEL_OUTOF10: 0
PAINLEVEL_OUTOF10: 0

## 2021-01-26 NOTE — DISCHARGE SUMMARY
Upon admission to E4 psychiatric unit:  Rachele denies feelings of harm towards self or others. Reports having depression and has struggled with this off and on for years. Denies ever being on a mental health med. Reports occasional mood swing. Repots past daily cannabis use and stopped 2 weeks ago. Reports came to hospital after argument with boyfriend she reports started by her. Reports had not been sleeping well. But slept well last night. Verified slept 7.5 hours continuous. Reports appetite is good. Reports having mother sister and boyfriend as support systems. Labs reviewed drawn 1-22-21 reflect no critical abnormals. Pregnancy negative. UDS positive for cannabis. Receptive for trial of Zoloft for c/o depression     Hospital Course:   Upon admission, Refugio Mejia was provided a safe secure environment, introduced to unit milieu. Patient participated in groups and individual therapies. Meds were adjusted as noted below. After few days of hospital care, patient began to feel improvement. Depression lifted, thoughts to harm self ceased. Sleep improved, appetite was good. On morning rounds 1/26/2021, Refugio Mejia endorses feeling ready for discharge. Patient denies suicidal or homicidal ideations, denies hallucinations or delusions. Denies SE's from meds. It was decided that maximum benefit from hospital care had been achieved and patient can be discharged. Consults:   none    Significant Diagnostic Studies: Routine labs and diagnostics    Treatments: Psychotropic medications, therapy with group, milieu, and 1:1 with nurses, social workers, PA-C/CNP, and Attending physician.       Discharge Medications:  Discharge Medication List as of 1/26/2021 11:25 AM      START taking these medications    Details   busPIRone (BUSPAR) 5 MG tablet Take 1 tablet by mouth 2 times daily, Disp-60 tablet, R-0Normal      sertraline (ZOLOFT) 50 MG tablet Take 1 tablet by mouth nightly, Disp-30 tablet, R-0Normal STOP taking these medications       norgestimate-ethinyl estradiol (SPRINTEC 28) 0.25-35 MG-MCG per tablet Comments:   Reason for Stopping:         dicyclomine (BENTYL) 10 MG capsule Comments:   Reason for Stopping:                Core Measures statement:   Not applicable    Discharge Exam:  Level of consciousness:  Within normal limits  Appearance: Street clothes, seated, with good grooming  Behavior/Motor: No abnormalities noted  Attitude toward examiner:  Cooperative, attentive, good eye contact  Speech:  spontaneous, normal rate, normal volume and well articulated  Mood:  euthymic  Affect:  Full range  Thought processes:  linear, goal directed and coherent  Thought content:  denies homicidal ideation  Suicidal Ideation:  denies suicidal ideation  Delusions:  no evidence of delusions  Perceptual Disturbance:  denies any perceptual disturbance  Cognition:  Intact  Memory: age appropriate  Insight & Judgement: fair  Medication side effects: denies     Disposition: home    Patient Instructions: Activity: activity as tolerated  1. Patient instructed to take medications regularly and follow up with outpatient appointments. Follow-up as scheduled with Joshua       Signed:    Electronically signed by Eb Butler MD on 1/26/21 at 12:32 PM EST    Time Spent on discharge is more than 35 minutes in the examination, evaluation, counseling and review of medications and discharge plan.

## 2021-01-26 NOTE — PROGRESS NOTES
Group Therapy Note    Date: 1/26/2021  Start Time: 1100  End Time:  1130  Type of Group: Healthy Living/Wellness      Notes:  Patient refused to attend group      Endings: None Reported    Modes of Intervention: Education      Discipline Responsible: Licensed Practical Nurse      Signature:  Marcello Chicas.  Asia Gupta LPN

## 2021-01-26 NOTE — DISCHARGE INSTR - DIET

## 2021-01-26 NOTE — PLAN OF CARE
Problem: Altered Mood, Depressive Behavior:  Goal: Able to verbalize and/or display a decrease in depressive symptoms  Description: Able to verbalize and/or display a decrease in depressive symptoms  1/25/2021 2135 by Lenny Bumpers, RN  Outcome: Met This Shift  Note: Rates mood a 9/10 this shift. 1/25/2021 1403 by Aviva Davila RN  Outcome: Ongoing  Note: Denies feelings of depression, verbalize feeling \"a little\" anxious at times. Goal: Ability to disclose and discuss suicidal ideas will improve  Description: Ability to disclose and discuss suicidal ideas will improve  1/25/2021 2135 by Lenny Bumpers, RN  Outcome: Met This Shift  Note: Denies suicidal thoughts or plans   1/25/2021 1403 by Aviva Davila RN  Outcome: Met This Shift  Note: Denies suicidal thoughts when questioned  Goal: Participates in care planning  Description: Participates in care planning  1/25/2021 2135 by Lenny Bumpers, RN  Outcome: Met This Shift  Note: Care plan reviewed with patient. Patient verbalize understanding of the plan of care and contribute to goal setting.    1/25/2021 1403 by Aviva Davila RN  Outcome: Ongoing  Note: Discussed tx plan with patient. Attends groups  Compliant with medications      Problem: Discharge Planning:  Goal: Discharged to appropriate level of care  Description: Discharged to appropriate level of care  1/25/2021 2135 by Lenny Bumpers, RN  Outcome: Met This Shift  Note: Probable discharge in the AM.  1/25/2021 1403 by Aviva Davila RN  Outcome: Ongoing  Note: Plans to return home with boyfriend.      Problem: Altered Mood, Depressive Behavior:  Goal: Patient specific goal  Description: Patient specific goal  1/25/2021 2135 by Lenny Bumpers, RN  Outcome: Ongoing  Note: Encouraged to set a daily goal.   1/25/2021 1403 by Aviva Davila RN  Outcome: Met This Shift  Note: Goal \"to learn to cope with things I can not control\"     Problem: Coping: Goal: Ability to identify problematic behaviors that deter socialization will improve  Description: Ability to identify problematic behaviors that deter socialization will improve  Outcome: Ongoing     Problem: Altered Mood, Depressive Behavior:  Goal: Able to verbalize acceptance of life and situations over which he or she has no control  Description: Able to verbalize acceptance of life and situations over which he or she has no control  1/25/2021 2135 by Flo Nunes RN  Outcome: Completed  Note: Verbalizes acceptance of life situations over which she has no control. 1/25/2021 1403 by Melia Ray RN  Outcome: Ongoing  Note: Patient verbalizes  acceptance of situations over which  has no control. Encouraged patient to attend group therapy. Goal: Able to verbalize support systems  Description: Able to verbalize support systems  1/25/2021 2135 by Flo Nunes RN  Outcome: Completed  Note: Boyfriend is her support   1/25/2021 1403 by Melia Ray RN  Outcome: Ongoing  Note: Describes boyfriend, sister, parents and work as her support  Goal: Absence of self-harm  Description: Absence of self-harm  1/25/2021 2135 by Flo Nunes RN  Outcome: Completed  Note: No self harm thoughts, plans, or behaviors. 1/25/2021 1403 by Melia Ray RN  Outcome: Met This Shift     Problem:  Activity:  Goal: Sleeping patterns will improve  Description: Sleeping patterns will improve  1/25/2021 2135 by Flo Nunes RN  Outcome: Completed  Note: Has been sleeping well at night.   1/25/2021 1403 by Melia Ray RN  Outcome: Ongoing  Note: Slept 8.5 hours hours last night

## 2021-01-26 NOTE — PROGRESS NOTES
Group Therapy Note    Date: 1/25/2021  Start Time: 2000  End Time:  2020    Type of Group: Wrap-Up/Relaxation    Status After Intervention:  Unchanged    Participation Level:  Active Listener    Participation Quality: Appropriate      Speech:  normal      Thought Process/Content: Linear      Affective Functioning: Congruent      Mood: euthymic      Level of consciousness:  Alert      Response to Learning: Able to verbalize current knowledge/experience      Endings: None Reported    Modes of Intervention: Support      Discipline Responsible: Registered Nurse      Signature:  Tayler Sawant RN

## 2021-01-26 NOTE — BH NOTE
Up to nurses station asking for atarax, request granted. Then asking for heart rate to be taken. States that she feels hot and nauseated. Patient then had a small yellow emesis. States that she feels better after vomiting. States that she is anxious about getting to go home today. Ginger ale given to sip.

## 2021-01-26 NOTE — PROGRESS NOTES
Discharge planning-Cynthia is to go for a walk in assessment. Walk in times are on Wednesday at 0900 am and Friday at 3:00 pm. Please arrive 30 minutes early to complete paperwork. Nadine Radford office.

## 2021-01-26 NOTE — PROGRESS NOTES
Behavioral Health   Discharge Note    Pt discharged with followings belongings:   Dentures: None  Vision - Corrective Lenses: None  Hearing Aid: None  Jewelry: Earrings, Ring, Necklace  Body Piercings Removed: N/A  Clothing: Footwear, Jacket / coat, Pants, Shirt  Were All Patient Medications Collected?: Other (comment)  Other Valuables: Purse, Wallet(birth control pills in patinet purse)   Valuables sent home with patient. Valuables retrieved from safe, Security envelope number:  N/A and returned to patient. Patient left department with staff via ambulatory. Discharged to private residence. \"An Important Message from [employer] About Your Rights\" form photocopy original from admission and provided to pt at discharge N/A. Patient education on aftercare instructions: YES  Bridge Appointment completed: Reviewed Discharge Instructions with patient. Patient verbalizes understanding and agreement with the discharge plan using the teachback method. Patient verbalize understanding of AVS:  YES.     Status EXAM upon discharge:  Status and Exam  Normal: Yes  Facial Expression: Flat, Brightened  Affect: Appropriate  Level of Consciousness: Alert  Mood:Normal: Yes  Mood: (euthymic)  Motor Activity:Normal: Yes  Interview Behavior: Cooperative  Preception: [place] to Person, Darlys Last to Time, [place] to Place, [place] to Situation  Attention:Normal: Yes  Thought Processes: (logical)  Thought Content:Normal: Yes  Hallucinations: None  Delusions: No  Memory:Normal: Yes  Insight and Judgment: No  Insight and Judgment: Poor Insight  Present Suicidal Ideation: No  Present Homicidal Ideation: No    Anh Flores RN

## 2021-01-28 ENCOUNTER — TELEPHONE (OUTPATIENT)
Dept: PSYCHIATRY | Age: 21
End: 2021-01-28

## 2021-02-15 ENCOUNTER — PATIENT MESSAGE (OUTPATIENT)
Dept: FAMILY MEDICINE CLINIC | Age: 21
End: 2021-02-15

## 2021-02-15 DIAGNOSIS — Z30.41 ENCOUNTER FOR SURVEILLANCE OF CONTRACEPTIVE PILLS: ICD-10-CM

## 2021-02-15 RX ORDER — NORGESTIMATE AND ETHINYL ESTRADIOL 0.25-0.035
1 KIT ORAL DAILY
Qty: 3 PACKET | Refills: 0 | Status: SHIPPED | OUTPATIENT
Start: 2021-02-15 | End: 2021-03-10 | Stop reason: SDUPTHER

## 2021-02-15 NOTE — TELEPHONE ENCOUNTER
From: Zenobia Moura  To: Emil Steven MD  Sent: 2/15/2021 10:52 AM EST  Subject: Prescription Question    Good morning,  I was wondering if you could please refill my birth control so I could pick it up today? I didn't realize I was on my last pack when I had finished it.    Kind regards,  Danny Goodson

## 2021-02-15 NOTE — TELEPHONE ENCOUNTER
Ruthie Sadler called requesting a refill on the following medications:  Requested Prescriptions     Pending Prescriptions Disp Refills    norgestimate-ethinyl estradiol ([id number] [id number]) 0.25-35 MG-MCG per tablet 3 packet 3     Sig: Take 1 tablet by mouth daily       Date of last visit: 12/11/2020  Date of next visit (if applicable):Visit date not found  Date of last refill:   Pharmacy Name:       Azucena Calabrese, [address] [place] [address]

## 2021-02-22 ENCOUNTER — TELEPHONE (OUTPATIENT)
Dept: FAMILY MEDICINE CLINIC | Age: 21
End: 2021-02-22

## 2021-02-22 NOTE — TELEPHONE ENCOUNTER
Office notes and current labs faxed to DRAGAN smith. [phone] att.  [facility]

## 2021-02-26 ENCOUNTER — PATIENT MESSAGE (OUTPATIENT)
Dept: FAMILY MEDICINE CLINIC | Age: 21
End: 2021-02-26

## 2021-02-26 NOTE — TELEPHONE ENCOUNTER
From: Patty Canales  To: Andrea Queen MD  Sent: 2/26/2021 9:21 AM EST  Subject: Prescription Question    Good morning,  I had just gotten off the phone with [facility] here in [place]. I was supposed to make an earlier appointment to see them for psychiatric care after I left inpatient at [facility], though time escaped me and I hadn't done so until earlier this week. They advised me to contact you, my primary doctor, about asking to refill my Buspar and Zoloft prescription as I am almost out of those and will not be seen at [facility] until this upcoming Wednesday.    Kind Regards,  Ewelina Ramírez

## 2021-02-27 RX ORDER — BUSPIRONE HYDROCHLORIDE 5 MG/1
5 TABLET ORAL 2 TIMES DAILY
Qty: 30 TABLET | Refills: 0 | Status: SHIPPED | OUTPATIENT
Start: 2021-02-27 | End: 2021-03-19 | Stop reason: SDUPTHER

## 2021-03-08 RX ORDER — BUSPIRONE HYDROCHLORIDE 5 MG/1
5 TABLET ORAL 2 TIMES DAILY
Qty: 30 TABLET | Refills: 0 | OUTPATIENT
Start: 2021-03-08 | End: 2021-04-07

## 2021-03-10 DIAGNOSIS — Z30.41 ENCOUNTER FOR SURVEILLANCE OF CONTRACEPTIVE PILLS: ICD-10-CM

## 2021-03-10 RX ORDER — NORGESTIMATE AND ETHINYL ESTRADIOL 0.25-0.035
1 KIT ORAL DAILY
Qty: 3 PACKET | Refills: 0 | Status: SHIPPED | OUTPATIENT
Start: 2021-03-10 | End: 2021-05-06 | Stop reason: SDUPTHER

## 2021-03-10 NOTE — TELEPHONE ENCOUNTER
Patty Canales called requesting a refill on the following medications:  Requested Prescriptions     Pending Prescriptions Disp Refills    norgestimate-ethinyl estradiol ([address] [address]) 0.25-35 MG-MCG per tablet 3 packet 0     Sig: Take 1 tablet by mouth daily       Date of last visit: 12/11/2020  Date of next visit (if applicable):Visit date not found  Date of last refill: 02/15/21  Pharmacy Name: Pascale Ortiz LPN       [facility] / pharmacy calls stating their company had switched to  Generic birth control Ignacio Vieira can not tolerate the generic and is requesting going back to the [address] [place] [address].

## 2021-03-15 ENCOUNTER — NURSE ONLY (OUTPATIENT)
Dept: LAB | Age: 21
End: 2021-03-15

## 2021-03-16 RX ORDER — BUSPIRONE HYDROCHLORIDE 5 MG/1
5 TABLET ORAL 2 TIMES DAILY
Qty: 30 TABLET | Refills: 0 | Status: CANCELLED | OUTPATIENT
Start: 2021-03-16 | End: 2021-04-15

## 2021-03-17 NOTE — TELEPHONE ENCOUNTER
Evelyne Hassan called requesting a refill on the following medications:  Requested Prescriptions     Pending Prescriptions Disp Refills    sertraline (ZOLOFT) 50 MG tablet 15 tablet 0     Sig: Take 1 tablet by mouth daily       Date of last visit: 12/11/2020  Date of next visit (if applicable):5/17/2021  Date of last refill: 02/27/21  Pharmacy Name: Dayami Perkins / Fernando Vargas LPN

## 2021-04-13 RX ORDER — BUSPIRONE HYDROCHLORIDE 5 MG/1
5 TABLET ORAL 2 TIMES DAILY
Qty: 60 TABLET | Refills: 0 | Status: SHIPPED | OUTPATIENT
Start: 2021-04-13 | End: 2021-05-13

## 2021-05-06 ENCOUNTER — OFFICE VISIT (OUTPATIENT)
Dept: FAMILY MEDICINE CLINIC | Age: 21
End: 2021-05-06
Payer: COMMERCIAL

## 2021-05-06 VITALS
OXYGEN SATURATION: 98 % | WEIGHT: 169 LBS | TEMPERATURE: 97.7 F | SYSTOLIC BLOOD PRESSURE: 128 MMHG | HEART RATE: 70 BPM | DIASTOLIC BLOOD PRESSURE: 60 MMHG | BODY MASS INDEX: 27.7 KG/M2

## 2021-05-06 DIAGNOSIS — R51.9 OCCIPITAL HEADACHE: ICD-10-CM

## 2021-05-06 DIAGNOSIS — K59.04 CHRONIC IDIOPATHIC CONSTIPATION: ICD-10-CM

## 2021-05-06 DIAGNOSIS — M54.2 NECK PAIN: Primary | ICD-10-CM

## 2021-05-06 DIAGNOSIS — M54.9 UPPER BACK PAIN: ICD-10-CM

## 2021-05-06 DIAGNOSIS — Z30.41 ENCOUNTER FOR SURVEILLANCE OF CONTRACEPTIVE PILLS: ICD-10-CM

## 2021-05-06 DIAGNOSIS — K21.9 GASTROESOPHAGEAL REFLUX DISEASE WITHOUT ESOPHAGITIS: ICD-10-CM

## 2021-05-06 DIAGNOSIS — F33.2 SEVERE EPISODE OF RECURRENT MAJOR DEPRESSIVE DISORDER, WITHOUT PSYCHOTIC FEATURES (HCC): ICD-10-CM

## 2021-05-06 PROBLEM — R10.10 INTERMITTENT UPPER ABDOMINAL PAIN: Status: RESOLVED | Noted: 2020-07-31 | Resolved: 2021-05-06

## 2021-05-06 PROCEDURE — 99214 OFFICE O/P EST MOD 30 MIN: CPT | Performed by: FAMILY MEDICINE

## 2021-05-06 RX ORDER — LINACLOTIDE 290 UG/1
CAPSULE, GELATIN COATED ORAL
COMMUNITY
Start: 2021-03-10

## 2021-05-06 RX ORDER — NORGESTIMATE AND ETHINYL ESTRADIOL 0.25-0.035
1 KIT ORAL DAILY
Qty: 3 PACKET | Refills: 3 | Status: SHIPPED | OUTPATIENT
Start: 2021-05-06 | End: 2021-07-26 | Stop reason: SDUPTHER

## 2021-05-06 RX ORDER — OMEPRAZOLE 20 MG/1
CAPSULE, DELAYED RELEASE ORAL
COMMUNITY
Start: 2021-03-18

## 2021-05-06 ASSESSMENT — ENCOUNTER SYMPTOMS
DIARRHEA: 0
NAUSEA: 0
CONSTIPATION: 1
ABDOMINAL PAIN: 0
SHORTNESS OF BREATH: 0
VOMITING: 0

## 2021-05-12 NOTE — TELEPHONE ENCOUNTER
Jay Albrecht called requesting a refill on the following medications:  Requested Prescriptions     Pending Prescriptions Disp Refills    busPIRone (BUSPAR) 5 MG tablet [Pharmacy Med Name: BUSPIRONE HCL 5 MG TABLET] 60 tablet 0     Sig: TAKE 1 TABLET BY MOUTH 2 TIMES DAILY       Date of last visit: 5/6/2021  Date of next visit (if applicable):8/6/2021  Date of last refill: 04/13/21  Pharmacy Name: Dennise Chau LPN

## 2021-05-13 RX ORDER — BUSPIRONE HYDROCHLORIDE 5 MG/1
5 TABLET ORAL 2 TIMES DAILY
Qty: 60 TABLET | Refills: 0 | OUTPATIENT
Start: 2021-05-13 | End: 2021-06-12

## 2021-05-13 NOTE — TELEPHONE ENCOUNTER
Patient reported at her last visit that she was getting these meds from psychiatrist now. We were filling until she saw them. Please clarify if she really needs this.   I have filled this several times in short amounts due to plan of being handled by specialist.

## 2021-05-13 NOTE — TELEPHONE ENCOUNTER
Spoke with the patient regarding the medications  Patient is presently getting the medication from her Psychiatrist

## 2021-05-17 ENCOUNTER — HOSPITAL ENCOUNTER (OUTPATIENT)
Dept: PHYSICAL THERAPY | Age: 21
Setting detail: THERAPIES SERIES
Discharge: HOME | End: 2021-05-17
Payer: COMMERCIAL

## 2021-05-17 PROCEDURE — 97162 PT EVAL MOD COMPLEX 30 MIN: CPT

## 2021-05-17 PROCEDURE — 97110 THERAPEUTIC EXERCISES: CPT
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
## 2021-05-21 ENCOUNTER — HOSPITAL ENCOUNTER (OUTPATIENT)
Dept: PHYSICAL THERAPY | Age: 21
Setting detail: THERAPIES SERIES
Discharge: HOME | End: 2021-05-21
Payer: COMMERCIAL

## 2021-05-21 PROCEDURE — 97110 THERAPEUTIC EXERCISES: CPT

## 2021-05-21 PROCEDURE — G0283 ELEC STIM OTHER THAN WOUND: HCPCS

## 2021-05-21 NOTE — PROGRESS NOTES
[address] [facility]  PHYSICAL THERAPY  [x] DAILY NOTE (LAND) [] DAILY NOTE (AQUATIC ) [] PROGRESS NOTE [] DISCHARGE NOTE    [] [facility]   [x] Janette     [] [facility]   [] Katia Lozano    Date: 2021  Patient Name:  Elias Dodson  : 2000  MRN: [id number]  CSN: [id number]    Referring Practitioner Angelica Madrid MD   Diagnosis Cervicalgia [M54.2]  Headache, unspecified [R51.9]  Dorsalgia, unspecified [M54.9]    Treatment Diagnosis Neck pain, HA   Date of Evaluation 21   Additional Pertinent History See PM      Functional Outcome Measure Used Neck disability scale   Functional Outcome Score eval score 27 (21)       Insurance: Primary: Payor: Unk Brush /  /  / ,   Secondary:    Authorization Information: Pays at 60%, modalities covered except massage and ionto not covered. 30 visits per . Visit # 1, 1/10 for progress note   Visits Allowed: 30   Recertification Date:    Physician Follow-Up: 21   Physician Orders:    History of Present Illness:      SUBJECTIVE: Patient reporting upper trap tension 5-6/10 and neck muscles 6-7/10. Currently denies having a headache. Patient reports good compliance with cervical retractions. OBJECTIVE:  TREATMENT   Precautions: None currently   Pain: Upper traps 5-6/10 and neck 6-7/10. X in shaded column indicates activity completed today   Modalities Parameters/  Location  Notes   Seated IFC to bilateral neck and upper traps.  4 patches yousif crossed  X          Manual Therapy Time/Technique  Notes               Exercise/Intervention   Notes   Cervical retraction by PT  3 x 10   x Reduced B UT 7 to 4/10, decreased neck 5 to 5/10   Cervical retraction by patient, cues for neutral posture 3 x 10  x UT pain decreased 4/10 to 3/10  Neck pain decreased 5/10- 4/10    Post Shoulder Rolls 10  x    Scapular Retractions  10  x    Shoulder Shrugs  10  x    Chin Tuck 5  x    Supine suboccipital Release  1-2 min  x At first felt good but started to get sore at 2 min so stopped there. Manual Traction  3 20-30 sec x                           Specific Interventions Next Treatment: IFC or US for pain control, try IFC  1st and if pain continues US. Massage not covered with insurance, may try Hawkgrips if IFC/US not helping. Gentle exercises for scapular exercises, cervical isometric, progress to cervical stretching and UE exercises as pain lessens. Activity/Treatment Tolerance:  [x]  Patient tolerated treatment well  []  Patient limited by fatigue  []  Patient limited by pain   []  Patient limited by medical complications  []  Other:     Assessment: Continued with cervical retractions with patient having a decrease in pain during. Did try and progress with gentle scapular strengthening exercises with patient having good to fair tolerance. Did initiate estim to bilateral neck and upper trap with patient reporting pain level in right upper trap 1/10, left upper trap 2-3/10 and neck pain 2/10 at end of session. GOALS:  Patient Goal: to get my pain to go away    Short Term Goals:  Time Frame: deferred to LTG's    Long Term Goals:  Time Frame: 5 weeks  1. Increase AROM cervical rotation B to 90 degrees, lateral flexion B to 45 degrees, extension to 50 degrees to allow patient to move head with driving with decreased neck/UT pain to 2/10 high  2. Increase cervical strength to good to allow patient to report able to work full shift with decreased neck/UT pain to 2/10  3. I with HEP as prescribed to allow no HA x 2 weeks with patient able to lessen pain with exercises.     Patient Education:   [x]  HEP/Education Completed: Plan of Care, Goals, HEP of cervical retraction 3 x 10 every 2 hours, monitor intensity of UT after HEP   Medbridge Access Code:  []  No new Education completed  []  Reviewed Prior HEP      []  Patient verbalized and/or demonstrated understanding of education

## 2021-05-25 ENCOUNTER — HOSPITAL ENCOUNTER (OUTPATIENT)
Dept: PHYSICAL THERAPY | Age: 21
Setting detail: THERAPIES SERIES
Discharge: HOME | End: 2021-05-25
Payer: COMMERCIAL

## 2021-05-25 PROCEDURE — G0283 ELEC STIM OTHER THAN WOUND: HCPCS

## 2021-05-25 PROCEDURE — 97110 THERAPEUTIC EXERCISES: CPT

## 2021-05-25 NOTE — PROGRESS NOTES
[address] [facility]  PHYSICAL THERAPY  [x] DAILY NOTE (LAND) [] DAILY NOTE (AQUATIC ) [] PROGRESS NOTE [] DISCHARGE NOTE    [] [address] [facility]   [x] Janette     [] [facility]   [] Darryltrent Jay    Date: 2021  Patient Name:  Nadia Coulter  : 2000  MRN: [id number]  CSN: [id number]    Referring Practitioner Basil Fong MD   Diagnosis Cervicalgia [M54.2]  Headache, unspecified [R51.9]  Dorsalgia, unspecified [M54.9]    Treatment Diagnosis Neck pain, HA   Date of Evaluation 21   Additional Pertinent History See PMH      Functional Outcome Measure Used Neck disability scale   Functional Outcome Score eval score 27 (21)       Insurance: Primary: Payor: Mohit Guzmán 150 /  /  / ,   Secondary:    Authorization Information: Pays at 60%, modalities covered except massage and ionto not covered. 30 visits per . Visit # 3, 3/10 for progress note   Visits Allowed: 30   Recertification Date: 30   Physician Follow-Up: 21   Physician Orders:    History of Present Illness:      SUBJECTIVE:   HA decreased from 1 x every 6 days to 1 x every 8 days, high decreased 8 to 7/10  B UT pain remains 100% of the day, high decreased 8 to 6/10  Neck pain remains 100% of the day, high decreased 8 to 7/10  OBJECTIVE:  TREATMENT   Precautions: None currently   Pain: Upper traps 5/10 and neck 3/10. X in shaded column indicates activity completed today   Modalities Parameters/  Location  Notes    IFC to bilateral neck and upper traps.  4 patches yousif crossed- prone face in cut out  X          Manual Therapy Time/Technique  Notes               Exercise/Intervention   Notes   Cervical retraction by PT  3 x 10   x Decreased UT 4 to 2/10   Cervical retraction by patient, cues for neutral posture 3 x 10  x UT pain decreased 4/10 to 2/10  Neck pain decreased 5/10- 4/10    Post Shoulder Rolls 10  x    Scapular Retractions  10  x    Shoulder Shrugs  10  x    Supine suboccipital Release  1-2 min   At first felt good but started to get sore at 2 min so stopped there. Manual Traction  3 20-30 sec                            Specific Interventions Next Treatment: IFC or US for pain control, try IFC  1st and if pain continues US. Massage not covered with insurance, may try Hawkgrips if IFC/US not helping. Gentle exercises for scapular exercises, cervical isometric, progress to cervical stretching and UE exercises as pain lessens. Activity/Treatment Tolerance:  [x]  Patient tolerated treatment well  []  Patient limited by fatigue  []  Patient limited by pain   []  Patient limited by medical complications  []  Other:     Assessment: patient pushing too hard with retractions and increased pain. Minimal pressure today and starting with cervical neutral positiona    GOALS:  Patient Goal: to get my pain to go away    Short Term Goals:  Time Frame: deferred to LTG's    Long Term Goals:  Time Frame: 5 weeks  1. Increase AROM cervical rotation B to 90 degrees, lateral flexion B to 45 degrees, extension to 50 degrees to allow patient to move head with driving with decreased neck/UT pain to 2/10 high  2. Increase cervical strength to good to allow patient to report able to work full shift with decreased neck/UT pain to 2/10  3. I with HEP as prescribed to allow no HA x 2 weeks with patient able to lessen pain with exercises. Patient Education:   [x]  HEP/Education Completed: Plan of Care, Goals, HEP of cervical retraction 3 x 10 every 2 hours, light pressure with retractions  []  No new Education completed  []  Reviewed Prior HEP      []  Patient verbalized and/or demonstrated understanding of education provided. []  Patient unable to verbalize and/or demonstrate understanding of education provided. Will continue education.   [x]  Barriers to learning: none    PLAN:2 times per week for 5 weeks  Treatment Recommendations: Strengthening, Range of Motion, Manual Therapy - Soft Tissue Mobilization, Pain Management, Home Exercise Program, Patient Education and Modalities  [x]  Continue with current plan of care  []  Modify plan of care as follows:    []  Hold pending physician visit  []  Discharge    Time In 1400   Time Out 1430   Timed Code Minutes: 20 min   Total Treatment Time: 30 min       Electronically Signed by: Noah Barragan, PT

## 2021-05-27 DIAGNOSIS — Z30.41 ENCOUNTER FOR SURVEILLANCE OF CONTRACEPTIVE PILLS: ICD-10-CM

## 2021-05-27 RX ORDER — NORGESTIMATE AND ETHINYL ESTRADIOL 0.25-0.035
1 KIT ORAL DAILY
Qty: 3 PACKET | Refills: 3 | OUTPATIENT
Start: 2021-05-27

## 2021-06-03 ENCOUNTER — HOSPITAL ENCOUNTER (OUTPATIENT)
Dept: PHYSICAL THERAPY | Age: 21
Setting detail: THERAPIES SERIES
Discharge: HOME | End: 2021-06-03
Payer: COMMERCIAL

## 2021-06-03 PROCEDURE — G0283 ELEC STIM OTHER THAN WOUND: HCPCS

## 2021-06-03 PROCEDURE — 97110 THERAPEUTIC EXERCISES: CPT

## 2021-06-07 ENCOUNTER — HOSPITAL ENCOUNTER (OUTPATIENT)
Dept: PHYSICAL THERAPY | Age: 21
Setting detail: THERAPIES SERIES
Discharge: HOME | End: 2021-06-07
Payer: COMMERCIAL

## 2021-06-07 PROCEDURE — G0283 ELEC STIM OTHER THAN WOUND: HCPCS

## 2021-06-07 PROCEDURE — 97110 THERAPEUTIC EXERCISES: CPT

## 2021-06-07 NOTE — PROGRESS NOTES
[address] [facility]  PHYSICAL THERAPY  [x] DAILY NOTE (LAND) [] DAILY NOTE (AQUATIC ) [] PROGRESS NOTE [] DISCHARGE NOTE    [] [address] [facility]   [x] Joellenmayra     [] [facility]   [] Kulwinder Xavierky    Date: 2021  Patient Name:  Regla Kearney  : 2000  MRN: [id number]  CSN: [id number]    Referring Practitioner Perfecto Lal MD   Diagnosis Cervicalgia [M54.2]  Headache, unspecified [R51.9]  Dorsalgia, unspecified [M54.9]    Treatment Diagnosis Neck pain, HA   Date of Evaluation 21   Additional Pertinent History See PM      Functional Outcome Measure Used Neck disability scale   Functional Outcome Score eval score 27 (21)       Insurance: Primary: Payor: Geovani Shoemaker /  /  / ,   Secondary:    Authorization Information: Pays at 60%, modalities covered except massage and ionto not covered. 30 visits per calendar. Visit # 4, 4/10 for progress note   Visits Allowed: 30   Recertification Date:    Physician Follow-Up: 21   Physician Orders:    History of Present Illness:      SUBJECTIVE:   HA decreased from 1 x every 6 days to 1 x every 8 days, high decreased 7 to 5/10  B UT pain remains 100% of the day, high decreased 6 to 5  Neck pain remains 100% decreased to 25% of the day, high decreased 7 to 5/10  OBJECTIVE:  TREATMENT   Precautions: None currently   Pain: Upper traps 5/10 and neck 3/10. X in shaded column indicates activity completed today   Modalities Parameters/  Location  Notes    IFC to bilateral neck and upper traps.  4 patches yousif crossed- prone face in cut out  X          Manual Therapy Time/Technique  Notes               Exercise/Intervention   Notes   Cervical retraction by PT  3 x 10   x Decreased UT 4 to 2/10   Cervical retraction by patient, cues for neutral posture 3 x 10  x UT pain decreased 4/10 to 2/10  Neck pain decreased 5/10- 4/10    Post Shoulder Rolls 10  x    Scapular Retractions  10 x    Shoulder Shrugs  10  x    Supine suboccipital Release  1-2 min   At first felt good but started to get sore at 2 min so stopped there. Manual Traction  3 20-30 sec     Cervical rotation R 5 x, L 5 x  x 2 x 10 retraction before and after                   Specific Interventions Next Treatment: IFC or US for pain control, try IFC  1st and if pain continues US. Massage not covered with insurance, may try Hawkgrips if IFC/US not helping. Gentle exercises for scapular exercises, cervical isometric, progress to cervical stretching and UE exercises as pain lessens. Activity/Treatment Tolerance:  [x]  Patient tolerated treatment well  []  Patient limited by fatigue  []  Patient limited by pain   []  Patient limited by medical complications  []  Other:     Assessment: added cervical rotation stretch 1 x per day, retraction before and after    GOALS:  Patient Goal: to get my pain to go away    Short Term Goals:  Time Frame: deferred to LT's    Long Term Goals:  Time Frame: 5 weeks  1. Increase AROM cervical rotation B to 90 degrees, lateral flexion B to 45 degrees, extension to 50 degrees to allow patient to move head with driving with decreased neck/UT pain to 2/10 high  2. Increase cervical strength to good to allow patient to report able to work full shift with decreased neck/UT pain to 2/10  3. I with HEP as prescribed to allow no HA x 2 weeks with patient able to lessen pain with exercises. Patient Education:   [x]  HEP/Education Completed: Plan of Care, Goals, HEP of cervical retraction 3 x 10 every 2 hours, light pressure with retractions, 1 x per day 5 reps rotation R/L  []  No new Education completed  []  Reviewed Prior HEP      []  Patient verbalized and/or demonstrated understanding of education provided. []  Patient unable to verbalize and/or demonstrate understanding of education provided. Will continue education.   [x]  Barriers to learning: none    PLAN:2 times per week for 5 weeks  Treatment Recommendations: Strengthening, Range of Motion, Manual Therapy - Soft Tissue Mobilization, Pain Management, Home Exercise Program, Patient Education and Modalities  [x]  Continue with current plan of care  []  Modify plan of care as follows:    []  Hold pending physician visit  []  Discharge    Time In 930   Time Out 1000   Timed Code Minutes: 20 min   Total Treatment Time: 30 min       Electronically Signed by: Nia Chung PT

## 2021-06-10 ENCOUNTER — HOSPITAL ENCOUNTER (OUTPATIENT)
Dept: PHYSICAL THERAPY | Age: 21
Setting detail: THERAPIES SERIES
Discharge: HOME | End: 2021-06-10
Payer: COMMERCIAL

## 2021-06-10 PROCEDURE — G0283 ELEC STIM OTHER THAN WOUND: HCPCS

## 2021-06-10 PROCEDURE — 97110 THERAPEUTIC EXERCISES: CPT

## 2021-06-10 NOTE — PROGRESS NOTES
Retractions  10  x    Shoulder Shrugs  10  x    Supine suboccipital Release  1-2 min   At first felt good but started to get sore at 2 min so stopped there. Manual Traction  3 20-30 sec     Cervical rotation R 5 x, L 5 x No hold x 2 x 10 retraction before and after                   Specific Interventions Next Treatment: IFC or US for pain control, try IFC  1st and if pain continues US. Massage not covered with insurance, may try Hawkgrips if IFC/US not helping. Gentle exercises for scapular exercises, cervical isometric, progress to cervical stretching and UE exercises as pain lessens. Activity/Treatment Tolerance:  [x]  Patient tolerated treatment well  []  Patient limited by fatigue  []  Patient limited by pain   []  Patient limited by medical complications  []  Other:     Assessment: added cervical lateral flexion stretch 1 x per day, retraction before and after    GOALS:  Patient Goal: to get my pain to go away    Short Term Goals:  Time Frame: deferred to LT's    Long Term Goals:  Time Frame: 5 weeks  1. Increase AROM cervical rotation B to 90 degrees, lateral flexion B to 45 degrees, extension to 50 degrees to allow patient to move head with driving with decreased neck/UT pain to 2/10 high  2. Increase cervical strength to good to allow patient to report able to work full shift with decreased neck/UT pain to 2/10  3. I with HEP as prescribed to allow no HA x 2 weeks with patient able to lessen pain with exercises. Patient Education:   [x]  HEP/Education Completed: Plan of Care, Goals, HEP of cervical retraction 3 x 10 every 2 hours, light pressure with retractions, 1 x per day 5 reps rotation + lateral flexion  R/L  []  No new Education completed  []  Reviewed Prior HEP      []  Patient verbalized and/or demonstrated understanding of education provided. []  Patient unable to verbalize and/or demonstrate understanding of education provided. Will continue education.   [x]  Barriers to learning: none    PLAN:2 times per week for 5 weeks  Treatment Recommendations: Strengthening, Range of Motion, Manual Therapy - Soft Tissue Mobilization, Pain Management, Home Exercise Program, Patient Education and Modalities  [x]  Continue with current plan of care  []  Modify plan of care as follows:    []  Hold pending physician visit  []  Discharge    Time In 930   Time Out 1000   Timed Code Minutes: 20 min   Total Treatment Time: 30 min       Electronically Signed by: Millie Lan PT

## 2021-06-14 ENCOUNTER — HOSPITAL ENCOUNTER (OUTPATIENT)
Dept: PHYSICAL THERAPY | Age: 21
Setting detail: THERAPIES SERIES
Discharge: HOME | End: 2021-06-14
Payer: COMMERCIAL

## 2021-06-14 PROCEDURE — 97110 THERAPEUTIC EXERCISES: CPT

## 2021-06-14 PROCEDURE — G0283 ELEC STIM OTHER THAN WOUND: HCPCS

## 2021-06-14 NOTE — PROGRESS NOTES
[address] [facility]  PHYSICAL THERAPY  [x] DAILY NOTE (LAND) [] DAILY NOTE (AQUATIC ) [] PROGRESS NOTE [] DISCHARGE NOTE    [] [address] [facility]   [x] Ivonradha     [] [facility]   [] Savita Crowell    Date: 2021  Patient Name:  Sandoval Perkins  : 2000  MRN: [id number]  CSN: [id number]    Referring Practitioner Toby Stewart MD   Diagnosis Cervicalgia [M54.2]  Headache, unspecified [R51.9]  Dorsalgia, unspecified [M54.9]    Treatment Diagnosis Neck pain, HA   Date of Evaluation 21   Additional Pertinent History See PM      Functional Outcome Measure Used Neck disability scale   Functional Outcome Score eval score 27 (21)       Insurance: Primary: Payor: Mohit Guzmán 150 /  /  / ,   Secondary:    Authorization Information: Pays at 60%, modalities covered except massage and ionto not covered. 30 visits per calendar. Visit # 7, 7/10 for progress note   Visits Allowed: 30   Recertification Date:    Physician Follow-Up: 21   Physician Orders:    History of Present Illness:      SUBJECTIVE:   Neck pain 4/10 and shoulder pain 3/10  OBJECTIVE:  TREATMENT   Precautions: None currently   Pain: Upper traps 5/10 and neck 3/10. X in shaded column indicates activity completed today   Modalities Parameters/  Location  Notes    IFC to bilateral neck and upper traps. 4 patches yousif crossed- prone face in cut out  X          Manual Therapy Time/Technique  Notes               Exercise/Intervention   Notes   Cervical retraction by PT  3 x 10   x Decreased UT 3 to 1-2/10   Cervical retraction by patient, cues for neutral posture 3 x 10  x UT pain decreased 1/10 to 1/10  Neck pain decreased 5/10- 1/10    Post Shoulder Rolls 15  x    Scapular Retractions  15  x    Shoulder Shrugs  15  x    Supine suboccipital Release  1-2 min   At first felt good but started to get sore at 2 min so stopped there.    Manual Traction  3 20-30 sec Cervical rotation R 5 x, L 5 x No hold x 3 x 10 retraction before and after   Cervical lateral flexion  5 L and 5 right No hold x             Specific Interventions Next Treatment: IFC or US for pain control, try IFC  1st and if pain continues US. Massage not covered with insurance, may try Hawkgrips if IFC/US not helping. Gentle exercises for scapular exercises, cervical isometric, progress to cervical stretching and UE exercises as pain lessens. Activity/Treatment Tolerance:  [x]  Patient tolerated treatment well  []  Patient limited by fatigue  []  Patient limited by pain   []  Patient limited by medical complications  []  Other:     Assessment: Added reps as noted with good tolerance. Patient denies having any pain at end of session. GOALS:  Patient Goal: to get my pain to go away    Short Term Goals:  Time Frame: deferred to LT's    Long Term Goals:  Time Frame: 5 weeks  1. Increase AROM cervical rotation B to 90 degrees, lateral flexion B to 45 degrees, extension to 50 degrees to allow patient to move head with driving with decreased neck/UT pain to 2/10 high  2. Increase cervical strength to good to allow patient to report able to work full shift with decreased neck/UT pain to 2/10  3. I with HEP as prescribed to allow no HA x 2 weeks with patient able to lessen pain with exercises. Patient Education:   []  HEP/Education Completed: Plan of Care, Goals, HEP of cervical retraction 3 x 10 every 2 hours, light pressure with retractions, 1 x per day 5 reps rotation + lateral flexion  R/L  [x]  No new Education completed  []  Reviewed Prior HEP      []  Patient verbalized and/or demonstrated understanding of education provided. []  Patient unable to verbalize and/or demonstrate understanding of education provided. Will continue education.   [x]  Barriers to learning: none    PLAN:2 times per week for 5 weeks  Treatment Recommendations: Strengthening, Range of Motion, Manual Therapy - Soft Tissue Mobilization, Pain Management, Home Exercise Program, Patient Education and Modalities  [x]  Continue with current plan of care  []  Modify plan of care as follows:    []  Hold pending physician visit  []  Discharge    Time In 1003   Time Out 1035   Timed Code Minutes: 16 min   Total Treatment Time: 33 min       Electronically Signed by: Sheyla Cramer PTA

## 2021-06-21 ENCOUNTER — APPOINTMENT (OUTPATIENT)
Dept: PHYSICAL THERAPY | Age: 21
End: 2021-06-21
Payer: COMMERCIAL

## 2021-06-21 ENCOUNTER — NURSE ONLY (OUTPATIENT)
Dept: LAB | Age: 21
End: 2021-06-21

## 2021-06-24 ENCOUNTER — HOSPITAL ENCOUNTER (OUTPATIENT)
Dept: PHYSICAL THERAPY | Age: 21
Setting detail: THERAPIES SERIES
Discharge: HOME | End: 2021-06-24
Payer: COMMERCIAL

## 2021-06-24 PROCEDURE — G0283 ELEC STIM OTHER THAN WOUND: HCPCS

## 2021-06-24 PROCEDURE — 97110 THERAPEUTIC EXERCISES: CPT

## 2021-06-24 NOTE — PROGRESS NOTES
[address] [facility]  PHYSICAL THERAPY  [x] DAILY NOTE (LAND) [] DAILY NOTE (AQUATIC ) [] PROGRESS NOTE [] DISCHARGE NOTE    [] 615 [facility]   [x] Joellenmayra     [] [facility]   [] Savita Crowell    Date: 2021  Patient Name:  Sandoval Perkins  : 2000  MRN: [id number]  CSN: [id number]    Referring Practitioner Toby Stewart MD   Diagnosis Cervicalgia [M54.2]  Headache, unspecified [R51.9]  Dorsalgia, unspecified [M54.9]    Treatment Diagnosis Neck pain, HA   Date of Evaluation 21   Additional Pertinent History See PMH      Functional Outcome Measure Used Neck disability scale   Functional Outcome Score eval score 27 (21)       Insurance: Primary: Payor: Mohit Guzmán 150 /  /  / ,   Secondary:    Authorization Information: Pays at 60%, modalities covered except massage and ionto not covered. 30 visits per calendar. Visit # 8, 8/10 for progress note   Visits Allowed: 30   Recertification Date: 79   Physician Follow-Up: 21   Physician Orders:    History of Present Illness:      SUBJECTIVE:   Reports neck pain today 2/10, high 4/10 after double shift on Saturday and . Reports no HA x 4 week  OBJECTIVE:  TREATMENT   Precautions: None currently   Pain:  neck 2/10. X in shaded column indicates activity completed today   Modalities Parameters/  Location  Notes    IFC to bilateral neck and upper traps.  4 patches yousif crossed- prone face in cut out  X          Manual Therapy Time/Technique  Notes               Exercise/Intervention   Notes   Cervical retraction by PT  3 x 10   x Decreased UT 3 to 1-2/10   Cervical retraction by patient, cues for neutral posture 3 x 10  x UT pain decreased 1/10 to 1/10  Neck pain decreased 5/10- 1/10    Post Shoulder Rolls 15  x    Scapular Retractions  15  x    Shoulder Shrugs  15  x    Supine suboccipital Release  1-2 min   At first felt good but started to get sore at 2 min so stopped there. Manual Traction  3 20-30 sec     Cervical rotation R 5 x, L 5 x No hold x 3 x 10 retraction before and after   Cervical lateral flexion  5 L and 5 right No hold x    Doorway pec stretch 5 5 x    tband B UE rows and extension peach 10x  x      Specific Interventions Next Treatment: IFC or US for pain control, try IFC  1st and if pain continues US. Massage not covered with insurance, may try Hawkgrips if IFC/US not helping. Gentle exercises for scapular exercises, cervical isometric, progress to cervical stretching and UE exercises as pain lessens. Activity/Treatment Tolerance:  [x]  Patient tolerated treatment well  []  Patient limited by fatigue  []  Patient limited by pain   []  Patient limited by medical complications  []  Other:     Assessment: added tband without increased pain, vended for HEP    GOALS:  Patient Goal: to get my pain to go away    Short Term Goals:  Time Frame: deferred to LT's    Long Term Goals:  Time Frame: 5 weeks  1. Increase AROM cervical rotation B to 90 degrees, lateral flexion B to 45 degrees, extension to 50 degrees to allow patient to move head with driving with decreased neck/UT pain to 2/10 high  2. Increase cervical strength to good to allow patient to report able to work full shift with decreased neck/UT pain to 2/10  3. I with HEP as prescribed to allow no HA x 2 weeks with patient able to lessen pain with exercises. Patient Education:   []  HEP/Education Completed: Plan of Care, Goals, HEP of cervical retraction 3 x 10 every 2 hours, light pressure with retractions, 1 x per day 5 reps rotation + lateral flexion  R/L  [x]  No new Education completed  []  Reviewed Prior HEP      []  Patient verbalized and/or demonstrated understanding of education provided. []  Patient unable to verbalize and/or demonstrate understanding of education provided. Will continue education.   [x]  Barriers to learning: none    PLAN:2 times per week for 5 weeks  Treatment Recommendations: Strengthening, Range of Motion, Manual Therapy - Soft Tissue Mobilization, Pain Management, Home Exercise Program, Patient Education and Modalities  [x]  Continue with current plan of care  []  Modify plan of care as follows:    []  Hold pending physician visit  []  Discharge    Time In 900   Time Out 930   Timed Code Minutes: 20 min   Total Treatment Time: 30 min       Electronically Signed by: Marlo Muñoz PT

## 2021-06-30 ENCOUNTER — HOSPITAL ENCOUNTER (OUTPATIENT)
Dept: PHYSICAL THERAPY | Age: 21
Setting detail: THERAPIES SERIES
Discharge: HOME | End: 2021-06-30
Payer: COMMERCIAL

## 2021-06-30 PROCEDURE — 97110 THERAPEUTIC EXERCISES: CPT

## 2021-06-30 NOTE — PROGRESS NOTES
[address] [facility]  PHYSICAL THERAPY  [x] DAILY NOTE (LAND) [] DAILY NOTE (AQUATIC ) [] PROGRESS NOTE [] DISCHARGE NOTE    [] 615 [facility]   [x] Ivonradha [address]    [] [facility]   [] Yaron Vazquez    Date: 2021  Patient Name:  Jozef Weaver  : 2000  MRN: [id number]  CSN: [id number]    Referring Practitioner Artist MD Jennifer   Diagnosis Cervicalgia [M54.2]  Headache, unspecified [R51.9]  Dorsalgia, unspecified [M54.9]    Treatment Diagnosis Neck pain, HA   Date of Evaluation 21   Additional Pertinent History See PMH      Functional Outcome Measure Used Neck disability scale   Functional Outcome Score eval score 27 (21)       Insurance: Primary: Payor: Lillie Morales /  /  / ,   Secondary:    Authorization Information: Pays at 60%, modalities covered except massage and ionto not covered. 30 visits per calendar. Visit # 9, 9/10 for progress note   Visits Allowed: 30   Recertification Date:    Physician Follow-Up: 21   Physician Orders:    History of Present Illness:      SUBJECTIVE:   Reports neck pain today 2/10 and shoulders 3/10. 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 Patient a few minutes late to session and asking to leave early to get to work. OBJECTIVE:  TREATMENT   Precautions: None currently   Pain:  neck 2/10. X in shaded column indicates activity completed today   Modalities Parameters/  Location  Notes    IFC to bilateral neck and upper traps. 4 patches yousif crossed- prone face in cut out            Manual Therapy Time/Technique  Notes               Exercise/Intervention   Notes   Cervical retraction by PT  3 x 10   x Decreased UT 3 to 2/10  Neck pain decreased 1/10-2/10 and shoulder 2/10.    Cervical retraction by patient, cues for neutral posture 3 x 10  x UT pain decreased 1/10 to 1/10  Neck pain  2/10- 2/10    Post Shoulder Rolls 15  x    Scapular Retractions  15  x    Shoulder Shrugs  15  x    Supine suboccipital Release  1-2 min   At first felt good but started to get sore at 2 min so stopped there. Manual Traction  3 20-30 sec     Cervical rotation R 5 x, L 5 x No hold x 3 x 10 retraction after   Cervical lateral flexion  5 L and 5 right No hold x    Doorway pec stretch 5 5 x    tband B UE rows and extension peach, ER 10x  x      Specific Interventions Next Treatment: IFC or US for pain control, try IFC  1st and if pain continues US. Massage not covered with insurance, may try Hawkgrips if IFC/US not helping. Gentle exercises for scapular exercises, cervical isometric, progress to cervical stretching and UE exercises as pain lessens. Activity/Treatment Tolerance:  [x]  Patient tolerated treatment well  []  Patient limited by fatigue  []  Patient limited by pain   []  Patient limited by medical complications  []  Other:     Assessment: Patient a few minutes late to session then asking to leave a little early. Progressed with t-band exercises with good tolerance. Patient reporting neck and upper trap pain 1-2/10 at end of session. GOALS:  Patient Goal: to get my pain to go away    Short Term Goals:  Time Frame: deferred to LTG's    Long Term Goals:  Time Frame: 5 weeks  1. Increase AROM cervical rotation B to 90 degrees, lateral flexion B to 45 degrees, extension to 50 degrees to allow patient to move head with driving with decreased neck/UT pain to 2/10 high  2. Increase cervical strength to good to allow patient to report able to work full shift with decreased neck/UT pain to 2/10  3. I with HEP as prescribed to allow no HA x 2 weeks with patient able to lessen pain with exercises. Patient Education:   []  HEP/Education Completed: posture/keeping upper traps relaxed. []  No new Education completed  []  Reviewed Prior HEP      []  Patient verbalized and/or demonstrated understanding of education provided.   []  Patient unable to verbalize and/or demonstrate understanding of education provided. Will continue education.   [x]  Barriers to learning: none    PLAN:2 times per week for 5 weeks  Treatment Recommendations: Strengthening, Range of Motion, Manual Therapy - Soft Tissue Mobilization, Pain Management, Home Exercise Program, Patient Education and Modalities  [x]  Continue with current plan of care  []  Modify plan of care as follows:    []  Hold pending physician visit  []  Discharge    Time In 1404   Time Out 1419   Timed Code Minutes: 15 min   Total Treatment Time: 15 min       Electronically Signed by: Elissa Faust PTA

## 2021-07-02 ENCOUNTER — APPOINTMENT (OUTPATIENT)
Dept: PHYSICAL THERAPY | Age: 21
End: 2021-07-02
Payer: COMMERCIAL

## 2021-07-06 ENCOUNTER — APPOINTMENT (OUTPATIENT)
Dept: PHYSICAL THERAPY | Age: 21
End: 2021-07-06
Payer: COMMERCIAL

## 2021-07-07 ENCOUNTER — HOSPITAL ENCOUNTER (OUTPATIENT)
Dept: PHYSICAL THERAPY | Age: 21
Setting detail: THERAPIES SERIES
Discharge: HOME | End: 2021-07-07
Payer: COMMERCIAL

## 2021-07-07 PROCEDURE — 97110 THERAPEUTIC EXERCISES: CPT

## 2021-07-07 PROCEDURE — G0283 ELEC STIM OTHER THAN WOUND: HCPCS

## 2021-07-07 NOTE — PROGRESS NOTES
[address]  PHYSICAL THERAPY  [x] DAILY NOTE (LAND) [] DAILY NOTE (AQUATIC ) [] PROGRESS NOTE [] DISCHARGE NOTE    [] [facility]   [x] [address]    [] [facility]   [] Randi Lights    Date: 2021  Patient Name:  Gem Copeland  : 2000  MRN: [id number]  CSN: [id number]    Referring Practitioner Terald Lefort, MD   Diagnosis No admission diagnoses are documented for this encounter. Treatment Diagnosis Neck pain, HA   Date of Evaluation 21   Additional Pertinent History See [facility]      Functional Outcome Measure Used Neck disability scale   Functional Outcome Score eval score 27 (21)       Insurance: Primary: Payor: Francis Machado /  /  / ,   Secondary:    Authorization Information: Pays at 60%, modalities covered except massage and ionto not covered. 30 visits per calendar. Visit # 10, 10/10 for progress note   Visits Allowed: 30   Recertification Date:    Physician Follow-Up: 21   Physician Orders:    History of Present Illness:      SUBJECTIVE:   4 in the shoulder and 2 in the neck. Patient reporting that she does feel like estim does help and is looking into getting a TEN unit for home use. OBJECTIVE:  TREATMENT   Precautions: None currently   Pain:  neck 2/10. 4/10 into the upper traps. X in shaded column indicates activity completed today   Modalities Parameters/  Location  Notes    IFC to bilateral neck and upper traps. 4 patches yousif crossed- prone face in cut out X15 min.  Intensity 8-8.5 x          Manual Therapy Time/Technique  Notes               Exercise/Intervention   Notes   Cervical retraction by PT  3 x 10   x Decreased UT 4 to 3/10  Neck pain decreased 12.5/10-2/10    Cervical retraction by patient, cues for neutral posture 3 x 10  x UT pain decreased 4/10 to 4/10  Neck pain  2/10- 2.5/10    Post Shoulder Rolls 15  x    Scapular Retractions  15  x    Shoulder Shrugs  15  x Cervical rotation R 5 x, L 5 x No hold x 3 x 10 retraction after   Cervical lateral flexion  5 L and 5 right No hold x    Doorway pec stretch 5 5 x    tband B UE rows and extension peach, ER 15x  x      Specific Interventions Next Treatment: IFC or US for pain control, try IFC  1st and if pain continues US. Massage not covered with insurance, may try Hawkgrips if IFC/US not helping. Gentle exercises for scapular exercises, cervical isometric, progress to cervical stretching and UE exercises as pain lessens. Activity/Treatment Tolerance:  [x]  Patient tolerated treatment well  []  Patient limited by fatigue  []  Patient limited by pain   []  Patient limited by medical complications  []  Other:     Assessment: Progressed reps of resistance band exercises with good tolerance. Did continue with estim with patient reporting having no pain at end of session. Patient last session today as patient [place]. GOALS:  Patient Goal: to get my pain to go away    Short Term Goals:  Time Frame: deferred to LTG's    Long Term Goals:  Time Frame: 5 weeks  1. Increase AROM cervical rotation B to 90 degrees, lateral flexion B to 45 degrees, extension to 50 degrees to allow patient to move head with driving with decreased neck/UT pain to 2/10 high  2. Increase cervical strength to good to allow patient to report able to work full shift with decreased neck/UT pain to 2/10  3. I with HEP as prescribed to allow no HA x 2 weeks with patient able to lessen pain with exercises. Patient Education:   []  HEP/Education Completed: posture/keeping upper traps relaxed. []  No new Education completed  []  Reviewed Prior HEP      []  Patient verbalized and/or demonstrated understanding of education provided. []  Patient unable to verbalize and/or demonstrate understanding of education provided. Will continue education.   [x]  Barriers to learning: none    PLAN:2 times per week for 5 weeks  Treatment Recommendations: Strengthening, Range of Motion, Manual Therapy - Soft Tissue Mobilization, Pain Management, Home Exercise Program, Patient Education and Modalities  [x]  Continue with current plan of care  []  Modify plan of care as follows:    []  Hold pending physician visit  []  Discharge    Time In 1403   Time Out 1433   Timed Code Minutes: 15 min   Total Treatment Time: 30 min       Electronically Signed by: Jameson Copeland PTA

## 2021-08-10 ENCOUNTER — HOSPITAL ENCOUNTER (OUTPATIENT)
Dept: PHYSICAL THERAPY | Age: 21
Setting detail: THERAPIES SERIES
Discharge: HOME | End: 2021-08-10
Payer: COMMERCIAL

## 2021-08-10 NOTE — DISCHARGE SUMMARY
[facility] NOTE  OUTPATIENT  [address] [facility].    Patient Name: Adina Tirado        CSN: [id number]   YOB: 2000  Gender: female  Elaine Nguyen MD,    No admission diagnoses are documented for this encounter. ,      Patient is discharged from Physical Therapy services at this time. See last note for details related to results of therapy and goal achievement. Reason for discharge: Patient requested to be discharged. . Is moving to another state, 10 visits done with less pain. Nhung Rizo.  [facility] # [id number]

## 2022-05-08 NOTE — TELEPHONE ENCOUNTER
Jay Albrecht called requesting a refill on the following medications:  Requested Prescriptions     Pending Prescriptions Disp Refills    sertraline (ZOLOFT) 50 MG tablet [Pharmacy Med Name: SERTRALINE HCL 50 MG TABLET] 30 tablet 0     Sig: TAKE 1 TABLET BY MOUTH EVERY DAY    busPIRone (BUSPAR) 5 MG tablet [Pharmacy Med Name: BUSPIRONE HCL 5 MG TABLET] 60 tablet 0     Sig: TAKE 1 TABLET BY MOUTH 2 TIMES DAILY       Date of last visit: 12/11/2020  Date of next visit (if applicable):5/17/2021  Pharmacy Name: [facility] ([address] [place] [address]) Abdominal pain

## 2022-12-09 NOTE — PROGRESS NOTES
Systems   Constitutional: Negative for fatigue and fever. Respiratory: Negative for shortness of breath. Cardiovascular: Negative for chest pain and leg swelling. Gastrointestinal: Positive for constipation (better controlled). Negative for abdominal pain, diarrhea, nausea and vomiting. Musculoskeletal: Positive for myalgias (upper back), neck pain and neck stiffness. Neurological: Positive for headaches. Psychiatric/Behavioral: The patient is not nervous/anxious. Objective:     /60 (Site: Right Upper Arm, Position: Sitting, Cuff Size: Large Adult)   Pulse 70   Temp 97.7 °F (36.5 °C) (Temporal)   Wt 169 lb (76.7 kg)   SpO2 98%   BMI 27.70 kg/m²     Physical Exam  Constitutional:       General: She is not in acute distress. Appearance: Normal appearance. She is not ill-appearing. HENT:      Head:      Comments: No tender areas noted today in occipital region  Neck:      Musculoskeletal: Muscular tenderness (lower right including right trapezius region) present. Cardiovascular:      Rate and Rhythm: Normal rate and regular rhythm. Heart sounds: No murmur. Pulmonary:      Effort: Pulmonary effort is normal. No respiratory distress. Breath sounds: Normal breath sounds. No wheezing. Musculoskeletal:         General: Tenderness (bilateral upper back muscles) present. No swelling. Neurological:      Mental Status: She is alert and oriented to person, place, and time. Psychiatric:         Mood and Affect: Mood normal.         Behavior: Behavior normal.         Assessment/Plan:     Yaneli Grant was seen today for results and gastroesophageal reflux. Diagnoses and all orders for this visit:    Neck pain  -     [facility] [place]    Encounter for surveillance of contraceptive pills  -     norgestimate-ethinyl estradiol ([address] [id number]) 0.25-35 MG-MCG per tablet;  Take 1 tablet by mouth daily Please do not change to [facility] as patient cannot tolerate this generic form.    Chronic idiopathic constipation    Severe episode of recurrent major depressive disorder, without psychotic features ([address]    Occipital headache  -     [facility]    Upper back pain  -     [facility]    Gastroesophageal reflux disease without esophagitis    Reviewed neck x-rays with her. Discussed my chart comments. She would like to proceed with physical therapy. She has psychiatry prescribing Zoloft and BuSpar at this time. She will continue work with GI for the GERD and constipation. Return in about 3 months (around 8/6/2021) for neck pain. No future appointments. This office note has been dictated. Effort was made to review for errors but some may have been missed. Please contact Romy Zarate of note for clarification if needed.        Electronically signed by Nora Caldera MD on 5/6/2021 at 11:24 AM declines

## 2024-10-22 ENCOUNTER — APPOINTMENT (RX ONLY)
Dept: URBAN - METROPOLITAN AREA CLINIC 85 | Facility: CLINIC | Age: 24
Setting detail: DERMATOLOGY
End: 2024-10-22

## 2024-10-22 DIAGNOSIS — Z80.8 FAMILY HISTORY OF MALIGNANT NEOPLASM OF OTHER ORGANS OR SYSTEMS: ICD-10-CM

## 2024-10-22 DIAGNOSIS — L21.8 OTHER SEBORRHEIC DERMATITIS: ICD-10-CM

## 2024-10-22 PROCEDURE — ? PRESCRIPTION

## 2024-10-22 PROCEDURE — ? COUNSELING

## 2024-10-22 PROCEDURE — 99204 OFFICE O/P NEW MOD 45 MIN: CPT

## 2024-10-22 RX ORDER — KETOCONAZOLE 20 MG/ML
SHAMPOO, SUSPENSION TOPICAL BIW
Qty: 120 | Refills: 11 | Status: ERX | COMMUNITY
Start: 2024-10-22

## 2024-10-22 RX ORDER — FLUCONAZOLE 100 MG/1
TABLET ORAL
Qty: 6 | Refills: 0 | Status: ERX | COMMUNITY
Start: 2024-10-22

## 2024-10-22 RX ORDER — KETOCONAZOLE 20 MG/G
CREAM TOPICAL BID
Qty: 60 | Refills: 3 | Status: ERX | COMMUNITY
Start: 2024-10-22

## 2024-10-22 RX ADMIN — KETOCONAZOLE: 20 CREAM TOPICAL at 00:00

## 2024-10-22 RX ADMIN — FLUCONAZOLE: 100 TABLET ORAL at 00:00

## 2024-10-22 RX ADMIN — KETOCONAZOLE: 20 SHAMPOO, SUSPENSION TOPICAL at 00:00

## 2024-10-22 ASSESSMENT — LOCATION ZONE DERM: LOCATION ZONE: SCALP

## 2024-10-22 ASSESSMENT — LOCATION DETAILED DESCRIPTION DERM: LOCATION DETAILED: RIGHT SUPERIOR PARIETAL SCALP

## 2024-10-22 ASSESSMENT — LOCATION SIMPLE DESCRIPTION DERM: LOCATION SIMPLE: SCALP

## 2024-11-26 ENCOUNTER — APPOINTMENT (RX ONLY)
Dept: URBAN - METROPOLITAN AREA CLINIC 85 | Facility: CLINIC | Age: 24
Setting detail: DERMATOLOGY
End: 2024-11-26

## 2024-11-26 DIAGNOSIS — L21.8 OTHER SEBORRHEIC DERMATITIS: ICD-10-CM

## 2024-11-26 PROCEDURE — 99213 OFFICE O/P EST LOW 20 MIN: CPT

## 2024-11-26 PROCEDURE — ? ADDITIONAL NOTES

## 2024-11-26 PROCEDURE — ? COUNSELING

## 2024-11-26 ASSESSMENT — LOCATION SIMPLE DESCRIPTION DERM: LOCATION SIMPLE: LEFT FOREHEAD

## 2024-11-26 ASSESSMENT — LOCATION ZONE DERM: LOCATION ZONE: FACE

## 2024-11-26 ASSESSMENT — LOCATION DETAILED DESCRIPTION DERM: LOCATION DETAILED: LEFT SUPERIOR FOREHEAD

## 2024-11-26 NOTE — PROCEDURE: ADDITIONAL NOTES
Additional Notes: Pt used shampoo and cream x3 weeks. After the second round of pills and that regiment, pt notes it has cleared up. \\nEducated pt about spores that will still be present. Recommend still using treatment once a week. Can increase or decrease use per symptoms.\\nPt doesn’t need refills at this time, but may call for refills as needed.
Render Risk Assessment In Note?: no
Detail Level: Simple